# Patient Record
Sex: FEMALE | Race: WHITE | NOT HISPANIC OR LATINO | URBAN - METROPOLITAN AREA
[De-identification: names, ages, dates, MRNs, and addresses within clinical notes are randomized per-mention and may not be internally consistent; named-entity substitution may affect disease eponyms.]

---

## 2017-04-28 PROBLEM — Z00.00 ENCOUNTER FOR PREVENTIVE HEALTH EXAMINATION: Status: ACTIVE | Noted: 2017-04-28

## 2024-03-30 ENCOUNTER — INPATIENT (INPATIENT)
Facility: HOSPITAL | Age: 69
LOS: 4 days | Discharge: HOME | DRG: 853 | End: 2024-04-04
Attending: STUDENT IN AN ORGANIZED HEALTH CARE EDUCATION/TRAINING PROGRAM | Admitting: STUDENT IN AN ORGANIZED HEALTH CARE EDUCATION/TRAINING PROGRAM
Payer: MEDICARE

## 2024-03-30 VITALS
SYSTOLIC BLOOD PRESSURE: 175 MMHG | OXYGEN SATURATION: 97 % | TEMPERATURE: 99 F | WEIGHT: 130.07 LBS | HEART RATE: 132 BPM | RESPIRATION RATE: 20 BRPM | DIASTOLIC BLOOD PRESSURE: 100 MMHG | HEIGHT: 60 IN

## 2024-03-30 PROCEDURE — 99285 EMERGENCY DEPT VISIT HI MDM: CPT

## 2024-03-30 NOTE — ED ADULT TRIAGE NOTE - CHIEF COMPLAINT QUOTE
constipation, previous abd pain and some decreased mental status.  recently tx for UTI.  patient dx with MS and has always had left sided weakness

## 2024-03-30 NOTE — ED ADULT TRIAGE NOTE - PAIN: PRESENCE, MLM
----- Message from JULIO CESAR Garcia sent at 2/14/2019  9:55 AM CST -----  Normal pap and HPV. TRC   denies pain/discomfort (Rating = 0)

## 2024-03-31 ENCOUNTER — TRANSCRIPTION ENCOUNTER (OUTPATIENT)
Age: 69
End: 2024-03-31

## 2024-03-31 DIAGNOSIS — G35 MULTIPLE SCLEROSIS: ICD-10-CM

## 2024-03-31 DIAGNOSIS — R41.82 ALTERED MENTAL STATUS, UNSPECIFIED: ICD-10-CM

## 2024-03-31 DIAGNOSIS — Z29.9 ENCOUNTER FOR PROPHYLACTIC MEASURES, UNSPECIFIED: ICD-10-CM

## 2024-03-31 DIAGNOSIS — A41.9 SEPSIS, UNSPECIFIED ORGANISM: ICD-10-CM

## 2024-03-31 DIAGNOSIS — R41.0 DISORIENTATION, UNSPECIFIED: ICD-10-CM

## 2024-03-31 LAB
ALBUMIN SERPL ELPH-MCNC: 3.7 G/DL — SIGNIFICANT CHANGE UP (ref 3.3–5)
ALBUMIN SERPL ELPH-MCNC: 4 G/DL — SIGNIFICANT CHANGE UP (ref 3.3–5)
ALP SERPL-CCNC: 79 U/L — SIGNIFICANT CHANGE UP (ref 40–120)
ALP SERPL-CCNC: 93 U/L — SIGNIFICANT CHANGE UP (ref 40–120)
ALT FLD-CCNC: 8 U/L — LOW (ref 10–45)
ALT FLD-CCNC: 9 U/L — LOW (ref 10–45)
ANION GAP SERPL CALC-SCNC: 15 MMOL/L — SIGNIFICANT CHANGE UP (ref 5–17)
ANION GAP SERPL CALC-SCNC: 16 MMOL/L — SIGNIFICANT CHANGE UP (ref 5–17)
APPEARANCE UR: ABNORMAL
AST SERPL-CCNC: 12 U/L — SIGNIFICANT CHANGE UP (ref 10–40)
AST SERPL-CCNC: 8 U/L — LOW (ref 10–40)
BACTERIA # UR AUTO: ABNORMAL /HPF
BASE EXCESS BLDV CALC-SCNC: 2.1 MMOL/L — SIGNIFICANT CHANGE UP (ref -2–3)
BASOPHILS # BLD AUTO: 0.08 K/UL — SIGNIFICANT CHANGE UP (ref 0–0.2)
BASOPHILS # BLD AUTO: 0.22 K/UL — HIGH (ref 0–0.2)
BASOPHILS NFR BLD AUTO: 0.3 % — SIGNIFICANT CHANGE UP (ref 0–2)
BASOPHILS NFR BLD AUTO: 0.9 % — SIGNIFICANT CHANGE UP (ref 0–2)
BILIRUB SERPL-MCNC: 0.4 MG/DL — SIGNIFICANT CHANGE UP (ref 0.2–1.2)
BILIRUB SERPL-MCNC: 0.6 MG/DL — SIGNIFICANT CHANGE UP (ref 0.2–1.2)
BILIRUB UR-MCNC: NEGATIVE — SIGNIFICANT CHANGE UP
BUN SERPL-MCNC: 14 MG/DL — SIGNIFICANT CHANGE UP (ref 7–23)
BUN SERPL-MCNC: 14 MG/DL — SIGNIFICANT CHANGE UP (ref 7–23)
CA-I SERPL-SCNC: 1.19 MMOL/L — SIGNIFICANT CHANGE UP (ref 1.15–1.33)
CALCIUM SERPL-MCNC: 8.8 MG/DL — SIGNIFICANT CHANGE UP (ref 8.4–10.5)
CALCIUM SERPL-MCNC: 9.4 MG/DL — SIGNIFICANT CHANGE UP (ref 8.4–10.5)
CAST: 2 /LPF — SIGNIFICANT CHANGE UP (ref 0–4)
CHLORIDE BLDV-SCNC: 105 MMOL/L — SIGNIFICANT CHANGE UP (ref 96–108)
CHLORIDE SERPL-SCNC: 103 MMOL/L — SIGNIFICANT CHANGE UP (ref 96–108)
CHLORIDE SERPL-SCNC: 103 MMOL/L — SIGNIFICANT CHANGE UP (ref 96–108)
CO2 BLDV-SCNC: 29 MMOL/L — HIGH (ref 22–26)
CO2 SERPL-SCNC: 21 MMOL/L — LOW (ref 22–31)
CO2 SERPL-SCNC: 24 MMOL/L — SIGNIFICANT CHANGE UP (ref 22–31)
COLOR SPEC: YELLOW — SIGNIFICANT CHANGE UP
CREAT SERPL-MCNC: 1 MG/DL — SIGNIFICANT CHANGE UP (ref 0.5–1.3)
CREAT SERPL-MCNC: 1.04 MG/DL — SIGNIFICANT CHANGE UP (ref 0.5–1.3)
DIFF PNL FLD: ABNORMAL
EGFR: 58 ML/MIN/1.73M2 — LOW
EGFR: 61 ML/MIN/1.73M2 — SIGNIFICANT CHANGE UP
EOSINOPHIL # BLD AUTO: 0 K/UL — SIGNIFICANT CHANGE UP (ref 0–0.5)
EOSINOPHIL # BLD AUTO: 0 K/UL — SIGNIFICANT CHANGE UP (ref 0–0.5)
EOSINOPHIL NFR BLD AUTO: 0 % — SIGNIFICANT CHANGE UP (ref 0–6)
EOSINOPHIL NFR BLD AUTO: 0 % — SIGNIFICANT CHANGE UP (ref 0–6)
FLUAV AG NPH QL: SIGNIFICANT CHANGE UP
FLUBV AG NPH QL: SIGNIFICANT CHANGE UP
GAS PNL BLDV: 137 MMOL/L — SIGNIFICANT CHANGE UP (ref 136–145)
GAS PNL BLDV: SIGNIFICANT CHANGE UP
GAS PNL BLDV: SIGNIFICANT CHANGE UP
GLUCOSE BLDV-MCNC: 126 MG/DL — HIGH (ref 70–99)
GLUCOSE SERPL-MCNC: 131 MG/DL — HIGH (ref 70–99)
GLUCOSE SERPL-MCNC: 141 MG/DL — HIGH (ref 70–99)
GLUCOSE UR QL: NEGATIVE MG/DL — SIGNIFICANT CHANGE UP
HCO3 BLDV-SCNC: 28 MMOL/L — SIGNIFICANT CHANGE UP (ref 22–29)
HCT VFR BLD CALC: 33.2 % — LOW (ref 34.5–45)
HCT VFR BLD CALC: 36.9 % — SIGNIFICANT CHANGE UP (ref 34.5–45)
HCT VFR BLDA CALC: 35 % — SIGNIFICANT CHANGE UP (ref 34.5–46.5)
HGB BLD CALC-MCNC: 11.6 G/DL — LOW (ref 11.7–16.1)
HGB BLD-MCNC: 10.3 G/DL — LOW (ref 11.5–15.5)
HGB BLD-MCNC: 11.3 G/DL — LOW (ref 11.5–15.5)
IMM GRANULOCYTES NFR BLD AUTO: 0.5 % — SIGNIFICANT CHANGE UP (ref 0–0.9)
KETONES UR-MCNC: 15 MG/DL
LACTATE BLDV-MCNC: 1.7 MMOL/L — SIGNIFICANT CHANGE UP (ref 0.5–2)
LEUKOCYTE ESTERASE UR-ACNC: ABNORMAL
LYMPHOCYTES # BLD AUTO: 0.22 K/UL — LOW (ref 1–3.3)
LYMPHOCYTES # BLD AUTO: 0.85 K/UL — LOW (ref 1–3.3)
LYMPHOCYTES # BLD AUTO: 0.9 % — LOW (ref 13–44)
LYMPHOCYTES # BLD AUTO: 3.4 % — LOW (ref 13–44)
MANUAL SMEAR VERIFICATION: SIGNIFICANT CHANGE UP
MCHC RBC-ENTMCNC: 26 PG — LOW (ref 27–34)
MCHC RBC-ENTMCNC: 26.2 PG — LOW (ref 27–34)
MCHC RBC-ENTMCNC: 30.6 GM/DL — LOW (ref 32–36)
MCHC RBC-ENTMCNC: 31 GM/DL — LOW (ref 32–36)
MCV RBC AUTO: 84.5 FL — SIGNIFICANT CHANGE UP (ref 80–100)
MCV RBC AUTO: 85 FL — SIGNIFICANT CHANGE UP (ref 80–100)
MONOCYTES # BLD AUTO: 1.08 K/UL — HIGH (ref 0–0.9)
MONOCYTES # BLD AUTO: 1.34 K/UL — HIGH (ref 0–0.9)
MONOCYTES NFR BLD AUTO: 4.5 % — SIGNIFICANT CHANGE UP (ref 2–14)
MONOCYTES NFR BLD AUTO: 5.4 % — SIGNIFICANT CHANGE UP (ref 2–14)
NEUTROPHILS # BLD AUTO: 22.4 K/UL — HIGH (ref 1.8–7.4)
NEUTROPHILS # BLD AUTO: 22.58 K/UL — HIGH (ref 1.8–7.4)
NEUTROPHILS NFR BLD AUTO: 90.4 % — HIGH (ref 43–77)
NEUTROPHILS NFR BLD AUTO: 93.7 % — HIGH (ref 43–77)
NITRITE UR-MCNC: NEGATIVE — SIGNIFICANT CHANGE UP
NRBC # BLD: 0 /100 WBCS — SIGNIFICANT CHANGE UP (ref 0–0)
PCO2 BLDV: 47 MMHG — HIGH (ref 39–42)
PH BLDV: 7.38 — SIGNIFICANT CHANGE UP (ref 7.32–7.43)
PH UR: 6 — SIGNIFICANT CHANGE UP (ref 5–8)
PLAT MORPH BLD: NORMAL — SIGNIFICANT CHANGE UP
PLATELET # BLD AUTO: 267 K/UL — SIGNIFICANT CHANGE UP (ref 150–400)
PLATELET # BLD AUTO: 313 K/UL — SIGNIFICANT CHANGE UP (ref 150–400)
PLATELET COUNT - ESTIMATE: NORMAL — SIGNIFICANT CHANGE UP
PO2 BLDV: 53 MMHG — HIGH (ref 25–45)
POTASSIUM BLDV-SCNC: 4 MMOL/L — SIGNIFICANT CHANGE UP (ref 3.5–5.1)
POTASSIUM SERPL-MCNC: 4 MMOL/L — SIGNIFICANT CHANGE UP (ref 3.5–5.3)
POTASSIUM SERPL-MCNC: 4.2 MMOL/L — SIGNIFICANT CHANGE UP (ref 3.5–5.3)
POTASSIUM SERPL-SCNC: 4 MMOL/L — SIGNIFICANT CHANGE UP (ref 3.5–5.3)
POTASSIUM SERPL-SCNC: 4.2 MMOL/L — SIGNIFICANT CHANGE UP (ref 3.5–5.3)
PROT SERPL-MCNC: 7.1 G/DL — SIGNIFICANT CHANGE UP (ref 6–8.3)
PROT SERPL-MCNC: 7.6 G/DL — SIGNIFICANT CHANGE UP (ref 6–8.3)
PROT UR-MCNC: 30 MG/DL
RBC # BLD: 3.93 M/UL — SIGNIFICANT CHANGE UP (ref 3.8–5.2)
RBC # BLD: 4.34 M/UL — SIGNIFICANT CHANGE UP (ref 3.8–5.2)
RBC # FLD: 15.7 % — HIGH (ref 10.3–14.5)
RBC # FLD: 15.7 % — HIGH (ref 10.3–14.5)
RBC BLD AUTO: SIGNIFICANT CHANGE UP
RBC CASTS # UR COMP ASSIST: 24 /HPF — HIGH (ref 0–4)
REVIEW: SIGNIFICANT CHANGE UP
RSV RNA NPH QL NAA+NON-PROBE: SIGNIFICANT CHANGE UP
SAO2 % BLDV: 85.6 % — SIGNIFICANT CHANGE UP (ref 67–88)
SARS-COV-2 RNA SPEC QL NAA+PROBE: SIGNIFICANT CHANGE UP
SODIUM SERPL-SCNC: 140 MMOL/L — SIGNIFICANT CHANGE UP (ref 135–145)
SODIUM SERPL-SCNC: 142 MMOL/L — SIGNIFICANT CHANGE UP (ref 135–145)
SP GR SPEC: 1.02 — SIGNIFICANT CHANGE UP (ref 1–1.03)
SQUAMOUS # UR AUTO: >36 /HPF — HIGH (ref 0–5)
UROBILINOGEN FLD QL: 0.2 MG/DL — SIGNIFICANT CHANGE UP (ref 0.2–1)
WBC # BLD: 23.91 K/UL — HIGH (ref 3.8–10.5)
WBC # BLD: 24.97 K/UL — HIGH (ref 3.8–10.5)
WBC # FLD AUTO: 23.91 K/UL — HIGH (ref 3.8–10.5)
WBC # FLD AUTO: 24.97 K/UL — HIGH (ref 3.8–10.5)
WBC UR QL: 60 /HPF — HIGH (ref 0–5)

## 2024-03-31 PROCEDURE — 70450 CT HEAD/BRAIN W/O DYE: CPT | Mod: 26,MC

## 2024-03-31 PROCEDURE — 71045 X-RAY EXAM CHEST 1 VIEW: CPT | Mod: 26

## 2024-03-31 PROCEDURE — 99223 1ST HOSP IP/OBS HIGH 75: CPT

## 2024-03-31 RX ORDER — BACLOFEN 100 %
30 POWDER (GRAM) MISCELLANEOUS EVERY 8 HOURS
Refills: 0 | Status: DISCONTINUED | OUTPATIENT
Start: 2024-03-31 | End: 2024-03-31

## 2024-03-31 RX ORDER — ATORVASTATIN CALCIUM 80 MG/1
40 TABLET, FILM COATED ORAL AT BEDTIME
Refills: 0 | Status: DISCONTINUED | OUTPATIENT
Start: 2024-03-31 | End: 2024-04-01

## 2024-03-31 RX ORDER — ACETAMINOPHEN 500 MG
1000 TABLET ORAL ONCE
Refills: 0 | Status: COMPLETED | OUTPATIENT
Start: 2024-03-31 | End: 2024-03-31

## 2024-03-31 RX ORDER — BACLOFEN 100 %
30 POWDER (GRAM) MISCELLANEOUS
Refills: 0 | Status: DISCONTINUED | OUTPATIENT
Start: 2024-03-31 | End: 2024-04-01

## 2024-03-31 RX ORDER — LANOLIN ALCOHOL/MO/W.PET/CERES
3 CREAM (GRAM) TOPICAL AT BEDTIME
Refills: 0 | Status: DISCONTINUED | OUTPATIENT
Start: 2024-03-31 | End: 2024-04-01

## 2024-03-31 RX ORDER — CHOLECALCIFEROL (VITAMIN D3) 125 MCG
2000 CAPSULE ORAL DAILY
Refills: 0 | Status: DISCONTINUED | OUTPATIENT
Start: 2024-03-31 | End: 2024-04-01

## 2024-03-31 RX ORDER — SODIUM CHLORIDE 9 MG/ML
1000 INJECTION INTRAMUSCULAR; INTRAVENOUS; SUBCUTANEOUS ONCE
Refills: 0 | Status: COMPLETED | OUTPATIENT
Start: 2024-03-31 | End: 2024-03-31

## 2024-03-31 RX ORDER — CEFTRIAXONE 500 MG/1
1000 INJECTION, POWDER, FOR SOLUTION INTRAMUSCULAR; INTRAVENOUS EVERY 24 HOURS
Refills: 0 | Status: DISCONTINUED | OUTPATIENT
Start: 2024-04-01 | End: 2024-04-01

## 2024-03-31 RX ORDER — TIZANIDINE 4 MG/1
8 TABLET ORAL AT BEDTIME
Refills: 0 | Status: DISCONTINUED | OUTPATIENT
Start: 2024-03-31 | End: 2024-04-01

## 2024-03-31 RX ORDER — ACETAMINOPHEN 500 MG
650 TABLET ORAL EVERY 6 HOURS
Refills: 0 | Status: DISCONTINUED | OUTPATIENT
Start: 2024-03-31 | End: 2024-04-01

## 2024-03-31 RX ORDER — CEFTRIAXONE 500 MG/1
1000 INJECTION, POWDER, FOR SOLUTION INTRAMUSCULAR; INTRAVENOUS ONCE
Refills: 0 | Status: COMPLETED | OUTPATIENT
Start: 2024-03-31 | End: 2024-03-31

## 2024-03-31 RX ORDER — ONDANSETRON 8 MG/1
4 TABLET, FILM COATED ORAL EVERY 8 HOURS
Refills: 0 | Status: DISCONTINUED | OUTPATIENT
Start: 2024-03-31 | End: 2024-04-01

## 2024-03-31 RX ADMIN — SODIUM CHLORIDE 1000 MILLILITER(S): 9 INJECTION INTRAMUSCULAR; INTRAVENOUS; SUBCUTANEOUS at 00:27

## 2024-03-31 RX ADMIN — Medication 30 MILLIGRAM(S): at 06:13

## 2024-03-31 RX ADMIN — Medication 30 MILLIGRAM(S): at 21:03

## 2024-03-31 RX ADMIN — Medication 400 MILLIGRAM(S): at 05:12

## 2024-03-31 RX ADMIN — Medication 1000 MILLIGRAM(S): at 08:27

## 2024-03-31 RX ADMIN — ATORVASTATIN CALCIUM 40 MILLIGRAM(S): 80 TABLET, FILM COATED ORAL at 21:03

## 2024-03-31 RX ADMIN — Medication 30 MILLIGRAM(S): at 15:21

## 2024-03-31 RX ADMIN — CEFTRIAXONE 100 MILLIGRAM(S): 500 INJECTION, POWDER, FOR SOLUTION INTRAMUSCULAR; INTRAVENOUS at 02:04

## 2024-03-31 NOTE — CONSULT NOTE ADULT - ASSESSMENT
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    Sepsis suspected due to UTI  Constipatoin  - AMS with fever and leukocytosis   - UA with some pyuria but contaminated specimen (>36 sq epi cells)  - COVID/RSV/Flu negative   - CXR with no consolidation  - CTH with no acute pathology     Recommendations:  Follow blood and urine cultures, in process  Obtain renal and bladder ultrasound to r/o upper tract disease   Continue ceftriaxone 1g IV Q24h  Monitor temps/WBC  Bowel regimen and rest of care per primary team    Osiel Jiang M.D.  [facility], Division of Infectious Diseases  [phone]  After 5pm on weekdays and all day on weekends - please call [phone]

## 2024-03-31 NOTE — ED PROVIDER NOTE - CLINICAL SUMMARY MEDICAL DECISION MAKING FREE TEXT BOX
68-year-old female, history of MS, presenting for confusion beginning today.  Recently treated for UTI with 5-day course nitrofuran:.  Approximately bedside and patient normally gets confused prior to UTIs.  Patient tachycardic at 132.  On physical exam heart tachycardic with regular rhythm, lungs clear, abdomen soft and nontender.  Will assess for infectious process.  Plan for basic labs, urinalysis plus culture.  Will also CT head Noncon for acute intracranial pathology though low suspicion.

## 2024-03-31 NOTE — H&P ADULT - NSHPPHYSICALEXAM_GEN_ALL_CORE
Vital Signs Last 24 Hrs  T(C): 37.8 (31 Mar 2024 04:48), Max: 38 (31 Mar 2024 01:25)  T(F): 100.1 (31 Mar 2024 04:48), Max: 100.4 (31 Mar 2024 01:25)  HR: 105 (31 Mar 2024 04:37) (105 - 132)  BP: 178/78 (31 Mar 2024 04:37) (156/88 - 178/78)  BP(mean): --  RR: 18 (31 Mar 2024 04:37) (18 - 20)  SpO2: 99% (31 Mar 2024 04:37) (97% - 99%)    Parameters below as of 31 Mar 2024 04:37  Patient On (Oxygen Delivery Method): room air Vital Signs Last 24 Hrs  T(C): 37.8 (31 Mar 2024 04:48), Max: 38 (31 Mar 2024 01:25)  T(F): 100.1 (31 Mar 2024 04:48), Max: 100.4 (31 Mar 2024 01:25)  HR: 105 (31 Mar 2024 04:37) (105 - 132)  BP: 178/78 (31 Mar 2024 04:37) (156/88 - 178/78)  BP(mean): --  RR: 18 (31 Mar 2024 04:37) (18 - 20)  SpO2: 99% (31 Mar 2024 04:37) (97% - 99%)    Parameters below as of 31 Mar 2024 04:37  Patient On (Oxygen Delivery Method): room air    GENERAL: No acute distress,  contracted    HEAD:  Atraumatic, Normocephalic  ENT: EOMI, PERRLA, conjunctiva and sclera clear,  moist mucosa no pharyngeal erythema or exudates   NECK: supple , no JVD   CHEST/LUNG: Clear to auscultation bilaterally; No wheeze, equal breath sounds bilaterally   BACK: No spinal tenderness,  No CVA tenderness   HEART: Regular rate and rhythm; No murmurs, rubs, or gallops  ABDOMEN: Soft, Nontender, + distended tympanic to percussion ; Bowel sounds present  EXTREMITIES:  No clubbing, cyanosis ,trace edema  MSK: contracted w/ limited ROM , No joint swelling or effusions  PSYCH: Normal behavior/affect  NEUROLOGY: AAOx3, paraplegic , cranial nerves intact  SKIN: Dry w/ scale , Normal color, No rashes or open  lesions

## 2024-03-31 NOTE — H&P ADULT - PROBLEM SELECTOR PLAN 3
- c/w baclofen 30mg q8h   - c/w Zaniflex 8mg qhs   - Hold Cellcept in setting of infection/sepsis   - Detrol non formulary, patient can take own medication if available

## 2024-03-31 NOTE — H&P ADULT - TIME BILLING
Chart review , case discussion with ED provider , obtain history via assistance of family ,  examination of patient , answering questions and concerns , ordering labs and medications , and documentation

## 2024-03-31 NOTE — H&P ADULT - PROBLEM SELECTOR PLAN 2
improved w/ treatment of UTI , now back to baseline , CT head w/ no acute findings  - continue above tx 2 seconds or less

## 2024-03-31 NOTE — ED ADULT NURSE NOTE - NSFALLRISKINTERV_ED_ALL_ED

## 2024-03-31 NOTE — H&P ADULT - PROBLEM SELECTOR PLAN 1
T 100.7 , WBC 23 , UA grossly positive , but poor quality specimen , unable to provide localizing symptoms , but mental state improved w/ tx of UTI , CXR w/ no focal lesions , RVP neg   - c/w ceftriaxone   - f/u urine and blood cultures  - s/p 1 L Iv fluid bolus , additional IV fluids as needed

## 2024-03-31 NOTE — ED ADULT NURSE REASSESSMENT NOTE - NS ED NURSE REASSESS COMMENT FT1
Pt changed, urinated in diaper. Pt changed into new clean diaper, pt skin dry and intact. Pt has blanchable sacral skin irritation, however no stageable ulcer. Pt tolerated well, comfort and safety maintained.

## 2024-03-31 NOTE — PATIENT PROFILE ADULT - DEAF OR HARD OF HEARING?
Admission Medication History Completed by Pharmacy    See [facility] Admission Navigator for allergy information, preferred outpatient pharmacy, prior to admission medications and immunization status.     Medication History Sources:   Patient, Surescripts    Changes made to PTA medication list (reason):  Added: None  Deleted:   Fish oil PO: 1 tab PO QDay (per patient)  VIIBRYD 10 m tab PO QDay (per patient and Surescripts)  Changed: None    Additional Information:  Furosemide: Stopped per MD on 21  Amoxicillin: patient took 2000 mg on  prior to a biopsy appointment    Prior to Admission medications    Medication Sig Last Dose Taking? Auth Provider   alendronate (FOSAMAX) 70 MG tablet TAKE 1 (ONE) TABLET (70 mg) BY MOUTH ONCE A WEEK 2021 Yes Reported, Patient   amLODIPine (NORVASC) 10 MG tablet Take 1 tablet (10 mg) by mouth daily NOTE TABLET MG CHANGE 2021 Yes Madhu Zuniga MD   aspirin 81 MG tablet Take 1 tablet (81 mg) by mouth daily. 12/15/2021 Yes Reported, Patient   azelastine (ASTEPRO) 0.15 % nasal spray INSTILL 1 SPRAY INTO BOTH NOSTRILS DAILY 2021 Yes Rolanda Wilcox MD   Calcium Citrate (CITRACAL OR) Take 1 tablet by mouth daily. Strength unknown 2021 Yes Reported, Patient   clopidogrel (PLAVIX) 75 MG tablet Take 1 tablet (75 mg) by mouth daily 2021 Yes Rolanda Wilcox MD   desvenlafaxine (PRISTIQ) 100 MG 24 hr tablet Take 100 mg by mouth daily 2021 Yes Reported, Patient   fluticasone-salmeterol (ADVAIR DISKUS) 500-50 MCG/DOSE inhaler INHALE 1 PUFF INTO THE LUNGS EVERY 12 HOURS 2021 Yes Rolanda Wilcox MD   lamoTRIgine (LAMICTAL) 25 MG tablet Take 25 mg by mouth daily with 200 mg tablet for a total dose of 225 mg 2021 Yes Reported, Patient   LAMOTRIGINE 200 MG PO TABS Take 200 mg by mouth daily with 25 mg tablet for a total dose of 225 mg 2021 Yes Reported, Patient   levothyroxine (SYNTHROID/LEVOTHROID) 50 MCG tablet Take 50 mcg by mouth daily  12/16/2021 Yes Reported, Patient   losartan (COZAAR) 100 MG tablet Take 1 tablet (100 mg) by mouth daily 12/16/2021 Yes Rolanda Wilcox MD   metoprolol tartrate (LOPRESSOR) 50 MG tablet TAKE 1 TAB (50 mg) BY MOUTH IN THE MORNING AND 1/2 TAB (25 mg) IN THE EVENING 12/16/2021 Yes Rolanda Wilcox MD   Multiple Vitamin (MULTI-VITAMIN) per tablet Take 1 tablet by mouth daily 12/16/2021 Yes Reported, Patient   rosuvastatin (CRESTOR) 40 MG tablet TAKE 1 TABLET (40 mg) BY MOUTH EVERY DAY 12/16/2021 Yes Rolanda Wilcox MD   verapamil ER (CALAN-SR) 240 MG CR tablet Take 1 tablet (240 mg) by mouth daily 12/16/2021 Yes Rolanda Wilcox MD   amoxicillin (AMOXIL) 500 MG tablet TAKE 4 TABLETS BY MOUTH 1 HOUR PRIOR TO APPT 12/14/2021  Javier Landa MD   CHOLECALCIFEROL 22751 UNIT PO CAPS 50,000 units by mouth weekly 12/11/2021 Yes Reported, Patient       Date completed: 12/16/21    Medication history completed by: Delia Ball, 4th year PharmD student             no

## 2024-03-31 NOTE — H&P ADULT - NSICDXPASTMEDICALHX_GEN_ALL_CORE_FT
PAST MEDICAL HISTORY:  Asthma     Benign Essential Hypertension     Hyperlipidemia     MS (Multiple Sclerosis) since 1984    Pneumonia 5/11

## 2024-03-31 NOTE — ED ADULT NURSE REASSESSMENT NOTE - NS ED NURSE REASSESS COMMENT FT1
KALPANA Reynolds contacted, pt had one episode of emesis after oral medication administration, no interventions necessary at time, VSS, safety and comfort measures maintained.

## 2024-03-31 NOTE — ED ADULT NURSE REASSESSMENT NOTE - NS ED NURSE REASSESS COMMENT FT1
Received patient from Emma RN, patient at [facility] mental status (unable to state current year), able to make needs known, NAD, VSS, patient agreeable to plan of care, pending Medicine bed, comfort and safety provided.  at bedside, 20G R forearm patent.

## 2024-03-31 NOTE — H&P ADULT - HISTORY OF PRESENT ILLNESS
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 She has similar symptoms in the past with infections. She was recently treated with a five day course of nitrofurantoin for UTI about one week prior to admission. Patient has no sensation and therefore no symptoms of dysuria. No reports fever or chills. No cough or nasal congestion. No open skin wounds. She was recently constipated , but treated with a laxative and suppository with relief of symptoms.

## 2024-03-31 NOTE — ED ADULT NURSE NOTE - OBJECTIVE STATEMENT
68y female w/ pmh of MS presents with abdominal pain. Pt accompanied by family who states pt has been constipated and expereincing abdominal pain for several days. Family states pt had recent UTI which she was treated for. Pt denies fever, chills, chest pain, shortness of breath, nausea, vomiting. Pt minimally ambulatory at baseline due to baseline MS status/ baseline left sided weakness.

## 2024-03-31 NOTE — H&P ADULT - NSICDXFAMILYHX_GEN_ALL_CORE_FT
FAMILY HISTORY:  Father  Still living? Unknown  FH: lung cancer, Age at diagnosis: Age Unknown    Mother  Still living? Unknown  FH: multiple myeloma, Age at diagnosis: Age Unknown

## 2024-03-31 NOTE — PATIENT PROFILE ADULT - FALL HARM RISK - HARM RISK INTERVENTIONS

## 2024-03-31 NOTE — ED PROVIDER NOTE - ATTENDING CONTRIBUTION TO CARE
Patient with history of MS, recently treated UTI last week, here due to somnolence.   According to patient's  patient has been more sleepy and somewhat confused from her baseline.  Yesterday she had complained of mild abdominal pain but it has gone away since she had a small bowel movement after getting a suppository.  There is no reported fever, respiratory symptoms, cough, nausea or vomiting or recurrent urinary symptoms.  Patient has been eating and drinking like normal, although the patient's  does report that she "is often dehydrated".  On exam patient is somnolent but easily arousable, alert and oriented when awake, following commands normally, but falls asleep when not stimulated.  She is breathing comfortably, soft nontender abdomen, afebrile, but tachycardic, dry mucous membranes.  There is concern about recurrent infection versus severe dehydration versus other metabolic arrangement.  While there are no reported falls given degree of cellulitis we will obtain head CT and send sepsis labs and give IV fluids.  We will antibiose as needed if labs show signs of infection.

## 2024-03-31 NOTE — ED PROVIDER NOTE - OBJECTIVE STATEMENT
68-year-old female, history of MS, presenting to ED brought in by  for confusion beginning earlier this afternoon.  Per  at bedside, patient was diagnosed with UTI about a week ago, completed 5-day course of nitrofurantoin.  States patient normally presents with some confusion prior to getting UTIs.  No recent head trauma or falls.  No cough, congestion, fever. 68-year-old female, history of MS, presenting to ED brought in by  for confusion beginning earlier this afternoon.  Per  at bedside, patient was diagnosed with UTI about a week ago, completed 5-day course of nitrofurantoin.  States patient normally presents with some confusion prior to getting UTIs.  No recent head trauma or falls.  No cough, congestion, fever.   notes patient c/o lower abdominal pain a few days ago with constipation, had small BM, and after suppository expelled large amount of gas with relief in pain.

## 2024-03-31 NOTE — H&P ADULT - NSHPREVIEWOFSYSTEMS_GEN_ALL_CORE
CONSTITUTIONAL: + weakness, no  fevers or chills  EYES/ENT: No visual changes;  No dysphagia  NECK: No pain or stiffness  RESPIRATORY: No cough, wheezing, hemoptysis; No shortness of breath  CARDIOVASCULAR: No chest pain or palpitations; No lower extremity edema  EXTREMITIES: no le edema, cyanosis, clubbing  GASTROINTESTINAL: No abdominal or epigastric pain. No nausea, vomiting, or hematemesis; No diarrhea + constipation. No melena or hematochezia.  BACK: No back pain  GENITOURINARY: No dysuria, frequency or hematuria  NEUROLOGICAL: + AMS , No change in numbness or weakness  MSK: no joint swelling or pain  SKIN: No itching, burning, rashes, or lesions   PSYCH: no agitation  All other review of systems is negative unless indicated above.

## 2024-03-31 NOTE — ED PROVIDER NOTE - PHYSICAL EXAMINATION
CONSTITUTIONAL: Somnolent appearing, arousable, and in no apparent distress.  HEAD: normocephalic, atraumatic  ENT: dry mucosa moist  CARDIAC: regular rate and rhythm; no murmurs, rubs, or gallops  RESPIRATORY: normal breath sounds, clear to ascultation bilaterally, no rales, rhonchi, wheezing  GASTROINTESTINAL: abdomen nondistended, soft, nontender, no guarding, rebound tenderness  MSK: Spine appears normal, range of motion is not limited, no muscle or joint tenderness  NEURO: Somnolent, but arousable and answers questions appropriately; at baseline neurologic condition   SKIN: Skin normal color for race, warm, dry and intact. No evidence of rash.  PSYCH: appropriate mood and affect

## 2024-03-31 NOTE — H&P ADULT - NSHPLABSRESULTS_GEN_ALL_CORE
Labs personally reviewed:                          11.3   23.91 )-----------( 313      ( 31 Mar 2024 00:02 )             36.9         142  |  103  |  14  ----------------------------<  131<H>  4.2   |  24  |  1.04    Ca    9.4      31 Mar 2024 00:02    TPro  7.6  /  Alb  4.0  /  TBili  0.6  /  DBili  x   /  AST  12  /  ALT  9<L>  /  AlkPhos  93          LIVER FUNCTIONS - ( 31 Mar 2024 00:02 )  Alb: 4.0 g/dL / Pro: 7.6 g/dL / ALK PHOS: 93 U/L / ALT: 9 U/L / AST: 12 U/L / GGT: x             Urinalysis Basic - ( 31 Mar 2024 01:23 )    Color: Yellow / Appearance: Turbid / S.020 / pH: x  Gluc: x / Ketone: 15 mg/dL  / Bili: Negative / Urobili: 0.2 mg/dL   Blood: x / Protein: 30 mg/dL / Nitrite: Negative   Leuk Esterase: Moderate / RBC: 24 /HPF / WBC 60 /HPF   Sq Epi: x / Non Sq Epi: >36 /HPF / Bacteria: Many /HPF      CAPILLARY BLOOD GLUCOSE      POCT Blood Glucose.: 137 mg/dL (30 Mar 2024 22:05)      Imaging:  CXR personally reviewed: no focal opacity    CT head :  No acute intracranial pathology.    Bifrontal atrophy again appreciated.      EKG personally reviewed: Labs personally reviewed:                          11.3   23.91 )-----------( 313      ( 31 Mar 2024 00:02 )             36.9         142  |  103  |  14  ----------------------------<  131<H>  4.2   |  24  |  1.04    Ca    9.4      31 Mar 2024 00:02    TPro  7.6  /  Alb  4.0  /  TBili  0.6  /  DBili  x   /  AST  12  /  ALT  9<L>  /  AlkPhos  93          LIVER FUNCTIONS - ( 31 Mar 2024 00:02 )  Alb: 4.0 g/dL / Pro: 7.6 g/dL / ALK PHOS: 93 U/L / ALT: 9 U/L / AST: 12 U/L / GGT: x             Urinalysis Basic - ( 31 Mar 2024 01:23 )    Color: Yellow / Appearance: Turbid / S.020 / pH: x  Gluc: x / Ketone: 15 mg/dL  / Bili: Negative / Urobili: 0.2 mg/dL   Blood: x / Protein: 30 mg/dL / Nitrite: Negative   Leuk Esterase: Moderate / RBC: 24 /HPF / WBC 60 /HPF   Sq Epi: x / Non Sq Epi: >36 /HPF / Bacteria: Many /HPF      CAPILLARY BLOOD GLUCOSE      POCT Blood Glucose.: 137 mg/dL (30 Mar 2024 22:05)      Imaging:  CXR personally reviewed: no focal opacity    CT head :  No acute intracranial pathology.    Bifrontal atrophy again appreciated.      EKG

## 2024-04-01 LAB
ALBUMIN SERPL ELPH-MCNC: 3.1 G/DL — LOW (ref 3.3–5)
ALP SERPL-CCNC: 66 U/L — SIGNIFICANT CHANGE UP (ref 40–120)
ALT FLD-CCNC: 7 U/L — LOW (ref 10–45)
ANION GAP SERPL CALC-SCNC: 15 MMOL/L — SIGNIFICANT CHANGE UP (ref 5–17)
AST SERPL-CCNC: 10 U/L — SIGNIFICANT CHANGE UP (ref 10–40)
BILIRUB SERPL-MCNC: 0.3 MG/DL — SIGNIFICANT CHANGE UP (ref 0.2–1.2)
BUN SERPL-MCNC: 15 MG/DL — SIGNIFICANT CHANGE UP (ref 7–23)
CALCIUM SERPL-MCNC: 8.7 MG/DL — SIGNIFICANT CHANGE UP (ref 8.4–10.5)
CHLORIDE SERPL-SCNC: 101 MMOL/L — SIGNIFICANT CHANGE UP (ref 96–108)
CO2 SERPL-SCNC: 22 MMOL/L — SIGNIFICANT CHANGE UP (ref 22–31)
CREAT SERPL-MCNC: 0.91 MG/DL — SIGNIFICANT CHANGE UP (ref 0.5–1.3)
CULTURE RESULTS: SIGNIFICANT CHANGE UP
EGFR: 68 ML/MIN/1.73M2 — SIGNIFICANT CHANGE UP
GLUCOSE SERPL-MCNC: 97 MG/DL — SIGNIFICANT CHANGE UP (ref 70–99)
HCT VFR BLD CALC: 30.1 % — LOW (ref 34.5–45)
HCV AB S/CO SERPL IA: 0.93 S/CO — SIGNIFICANT CHANGE UP (ref 0–0.99)
HCV AB SERPL-IMP: ABNORMAL
HCV RNA FLD QL NAA+PROBE: SIGNIFICANT CHANGE UP
HCV RNA SPEC QL PROBE+SIG AMP: SIGNIFICANT CHANGE UP
HGB BLD-MCNC: 9.5 G/DL — LOW (ref 11.5–15.5)
MCHC RBC-ENTMCNC: 26.6 PG — LOW (ref 27–34)
MCHC RBC-ENTMCNC: 31.6 GM/DL — LOW (ref 32–36)
MCV RBC AUTO: 84.3 FL — SIGNIFICANT CHANGE UP (ref 80–100)
NRBC # BLD: 0 /100 WBCS — SIGNIFICANT CHANGE UP (ref 0–0)
PLATELET # BLD AUTO: 234 K/UL — SIGNIFICANT CHANGE UP (ref 150–400)
POTASSIUM SERPL-MCNC: 4 MMOL/L — SIGNIFICANT CHANGE UP (ref 3.5–5.3)
POTASSIUM SERPL-SCNC: 4 MMOL/L — SIGNIFICANT CHANGE UP (ref 3.5–5.3)
PROT SERPL-MCNC: 6.3 G/DL — SIGNIFICANT CHANGE UP (ref 6–8.3)
RBC # BLD: 3.57 M/UL — LOW (ref 3.8–5.2)
RBC # FLD: 15.8 % — HIGH (ref 10.3–14.5)
SODIUM SERPL-SCNC: 138 MMOL/L — SIGNIFICANT CHANGE UP (ref 135–145)
SPECIMEN SOURCE: SIGNIFICANT CHANGE UP
WBC # BLD: 12.07 K/UL — HIGH (ref 3.8–10.5)
WBC # FLD AUTO: 12.07 K/UL — HIGH (ref 3.8–10.5)

## 2024-04-01 PROCEDURE — 74176 CT ABD & PELVIS W/O CONTRAST: CPT | Mod: 26

## 2024-04-01 PROCEDURE — 52332 CYSTOSCOPY AND TREATMENT: CPT | Mod: 50

## 2024-04-01 PROCEDURE — 74420 UROGRAPHY RTRGR +-KUB: CPT | Mod: 26

## 2024-04-01 PROCEDURE — 76770 US EXAM ABDO BACK WALL COMP: CPT | Mod: 26

## 2024-04-01 DEVICE — URETERAL STENT INLAY OPTIMA 8FR 24CM: Type: IMPLANTABLE DEVICE | Site: LEFT, RIGHT | Status: FUNCTIONAL

## 2024-04-01 DEVICE — GUIDEWIRE SENSOR DUAL-FLEX NITINOL STRAIGHT .035" X 150CM: Type: IMPLANTABLE DEVICE | Site: LEFT, RIGHT | Status: FUNCTIONAL

## 2024-04-01 DEVICE — URETERAL CATH SOF-FLEX OPEN END 6FR .040" X 70CM: Type: IMPLANTABLE DEVICE | Site: LEFT, RIGHT | Status: FUNCTIONAL

## 2024-04-01 RX ORDER — CHOLECALCIFEROL (VITAMIN D3) 125 MCG
2000 CAPSULE ORAL DAILY
Refills: 0 | Status: DISCONTINUED | OUTPATIENT
Start: 2024-04-01 | End: 2024-04-04

## 2024-04-01 RX ORDER — LANOLIN ALCOHOL/MO/W.PET/CERES
3 CREAM (GRAM) TOPICAL AT BEDTIME
Refills: 0 | Status: DISCONTINUED | OUTPATIENT
Start: 2024-04-01 | End: 2024-04-04

## 2024-04-01 RX ORDER — FENTANYL CITRATE 50 UG/ML
25 INJECTION INTRAVENOUS
Refills: 0 | Status: DISCONTINUED | OUTPATIENT
Start: 2024-04-01 | End: 2024-04-02

## 2024-04-01 RX ORDER — CEFTRIAXONE 500 MG/1
1000 INJECTION, POWDER, FOR SOLUTION INTRAMUSCULAR; INTRAVENOUS EVERY 24 HOURS
Refills: 0 | Status: DISCONTINUED | OUTPATIENT
Start: 2024-04-01 | End: 2024-04-04

## 2024-04-01 RX ORDER — SODIUM CHLORIDE 9 MG/ML
1000 INJECTION INTRAMUSCULAR; INTRAVENOUS; SUBCUTANEOUS
Refills: 0 | Status: DISCONTINUED | OUTPATIENT
Start: 2024-04-01 | End: 2024-04-04

## 2024-04-01 RX ORDER — ONDANSETRON 8 MG/1
4 TABLET, FILM COATED ORAL EVERY 8 HOURS
Refills: 0 | Status: DISCONTINUED | OUTPATIENT
Start: 2024-04-01 | End: 2024-04-04

## 2024-04-01 RX ORDER — ACETAMINOPHEN 500 MG
650 TABLET ORAL EVERY 6 HOURS
Refills: 0 | Status: DISCONTINUED | OUTPATIENT
Start: 2024-04-01 | End: 2024-04-04

## 2024-04-01 RX ORDER — ATORVASTATIN CALCIUM 80 MG/1
40 TABLET, FILM COATED ORAL AT BEDTIME
Refills: 0 | Status: DISCONTINUED | OUTPATIENT
Start: 2024-04-01 | End: 2024-04-04

## 2024-04-01 RX ORDER — SODIUM CHLORIDE 9 MG/ML
1000 INJECTION INTRAMUSCULAR; INTRAVENOUS; SUBCUTANEOUS
Refills: 0 | Status: DISCONTINUED | OUTPATIENT
Start: 2024-04-01 | End: 2024-04-01

## 2024-04-01 RX ORDER — BACLOFEN 100 %
5 POWDER (GRAM) MISCELLANEOUS EVERY 12 HOURS
Refills: 0 | Status: DISCONTINUED | OUTPATIENT
Start: 2024-04-01 | End: 2024-04-02

## 2024-04-01 RX ORDER — ONDANSETRON 8 MG/1
4 TABLET, FILM COATED ORAL ONCE
Refills: 0 | Status: DISCONTINUED | OUTPATIENT
Start: 2024-04-01 | End: 2024-04-02

## 2024-04-01 RX ORDER — ACETAMINOPHEN 500 MG
1000 TABLET ORAL ONCE
Refills: 0 | Status: COMPLETED | OUTPATIENT
Start: 2024-04-01 | End: 2024-04-01

## 2024-04-01 RX ADMIN — Medication 30 MILLIGRAM(S): at 17:47

## 2024-04-01 RX ADMIN — Medication 2000 UNIT(S): at 10:50

## 2024-04-01 RX ADMIN — SODIUM CHLORIDE 75 MILLILITER(S): 9 INJECTION INTRAMUSCULAR; INTRAVENOUS; SUBCUTANEOUS at 10:50

## 2024-04-01 RX ADMIN — Medication 30 MILLIGRAM(S): at 10:51

## 2024-04-01 RX ADMIN — Medication 400 MILLIGRAM(S): at 00:39

## 2024-04-01 RX ADMIN — Medication 30 MILLIGRAM(S): at 05:27

## 2024-04-01 RX ADMIN — TIZANIDINE 8 MILLIGRAM(S): 4 TABLET ORAL at 00:02

## 2024-04-01 RX ADMIN — Medication 1000 MILLIGRAM(S): at 01:39

## 2024-04-01 RX ADMIN — SODIUM CHLORIDE 75 MILLILITER(S): 9 INJECTION INTRAMUSCULAR; INTRAVENOUS; SUBCUTANEOUS at 00:43

## 2024-04-01 RX ADMIN — CEFTRIAXONE 100 MILLIGRAM(S): 500 INJECTION, POWDER, FOR SOLUTION INTRAMUSCULAR; INTRAVENOUS at 02:00

## 2024-04-01 NOTE — PROGRESS NOTE ADULT - SUBJECTIVE AND OBJECTIVE BOX
Patient is a 68y old  Female who presents with a chief complaint of UTI/ sepsis (01 Apr 2024 10:26)      SUBJECTIVE / OVERNIGHT EVENTS:    Patient seen and examined.   off floor for US.    Vital Signs Last 24 Hrs  T(C): 37.1 (01 Apr 2024 04:35), Max: 37.3 (01 Apr 2024 00:32)  T(F): 98.7 (01 Apr 2024 04:35), Max: 99.1 (01 Apr 2024 00:32)  HR: 121 (01 Apr 2024 04:35) (90 - 124)  BP: 148/87 (01 Apr 2024 04:35) (148/85 - 173/89)  BP(mean): --  RR: 18 (01 Apr 2024 04:35) (17 - 18)  SpO2: 99% (01 Apr 2024 04:35) (96% - 100%)    Parameters below as of 01 Apr 2024 04:35  Patient On (Oxygen Delivery Method): room air      I&O's Summary    31 Mar 2024 07:01  -  01 Apr 2024 07:00  --------------------------------------------------------  IN: 0 mL / OUT: 600 mL / NET: -600 mL    LABS:                        9.5    12.07 )-----------( 234      ( 01 Apr 2024 07:12 )             30.1     04-01    138  |  101  |  15  ----------------------------<  97  4.0   |  22  |  0.91    Ca    8.7      01 Apr 2024 07:10    TPro  6.3  /  Alb  3.1<L>  /  TBili  0.3  /  DBili  x   /  AST  10  /  ALT  7<L>  /  AlkPhos  66  04-01      CAPILLARY BLOOD GLUCOSE            Urinalysis Basic - ( 01 Apr 2024 07:10 )    Color: x / Appearance: x / SG: x / pH: x  Gluc: 97 mg/dL / Ketone: x  / Bili: x / Urobili: x   Blood: x / Protein: x / Nitrite: x   Leuk Esterase: x / RBC: x / WBC x   Sq Epi: x / Non Sq Epi: x / Bacteria: x        RADIOLOGY & ADDITIONAL TESTS:    Imaging Personally Reviewed:  [x] YES  [ ] NO    Consultant(s) Notes Reviewed:  [x] YES  [ ] NO    MEDICATIONS  (STANDING):  atorvastatin 40 milliGRAM(s) Oral at bedtime  baclofen 30 milliGRAM(s) Oral <User Schedule>  cefTRIAXone   IVPB 1000 milliGRAM(s) IV Intermittent every 24 hours  cholecalciferol 2000 Unit(s) Oral daily  sodium chloride 0.9%. 1000 milliLiter(s) (75 mL/Hr) IV Continuous <Continuous>  sodium chloride 0.9%. 1000 milliLiter(s) (75 mL/Hr) IV Continuous <Continuous>  tiZANidine 8 milliGRAM(s) Oral at bedtime    MEDICATIONS  (PRN):  acetaminophen     Tablet .. 650 milliGRAM(s) Oral every 6 hours PRN Temp greater or equal to 38C (100.4F), Mild Pain (1 - 3)  melatonin 3 milliGRAM(s) Oral at bedtime PRN Insomnia  ondansetron Injectable 4 milliGRAM(s) IV Push every 8 hours PRN Nausea and/or Vomiting      Care Discussed with Consultants/Other Providers [x] YES  [ ] NO    HEALTH ISSUES - PROBLEM Dx:  Sepsis secondary to UTI    Multiple sclerosis    Need for prophylactic measure    AMS (altered mental status)

## 2024-04-01 NOTE — PROGRESS NOTE ADULT - ASSESSMENT
68F  w/pmh of MS paraplegic on Cellcept p/w AMS , febrile w/ leukocytosis     Sepsis secondary to UTI  - c/w ceftriaxone   - f/u urine and blood cultures  - renal US    AMS (altered mental status).   improved w/ treatment of UTI , now back to baseline , CT head w/ no acute findings  - continue above tx    Multiple sclerosis  - c/w baclofen 30mg q8h   - c/w Zaniflex 8mg qhs   - Hold Cellcept in setting of infection/sepsis   - Detrol non formulary, patient can take own medication if available    lmwh for dvt ppx    Please call [facility] with questions [phone].

## 2024-04-01 NOTE — CONSULT NOTE ADULT - ATTENDING COMMENTS
CT, ultrasound, labs reviewed  Fever, bilateral ureteral hydronephrosis, left distal stone and right staghorn stone.  Concern for  source  Emergent OR for cystoscopy bilateral ureteral stents insertion.  R/b/a explained to patient and her spouse.  Consent obtained.

## 2024-04-01 NOTE — ADVANCED PRACTICE NURSE CONSULT - REASON FOR CONSULT
Requested by staff to assess skin status: sacrum and b/l heels. PMH is noted:  Reason for Admission: UTI/ sepsis  History of Present Illness:   Patient is a 68-year-old female, w/pmh of MS paraplegic on cellcept, presents for altered mental state for the past day   Per  , patient was confused with slurred speech, repeating herself , and disoriented. She has similar symptoms in the past with infections. She was recently treated with a five day course of nitrofurantoin for UTI about one week prior to admission. Patient has no sensation and therefore no symptoms of dysuria. No reports fever or chills. No cough or nasal congestion. No open skin wounds. She was recently constipated , but treated with a laxative and suppository with relief of symptoms.

## 2024-04-01 NOTE — BRIEF OPERATIVE NOTE - SPECIMENS
"Patient presents to the ED via personal transportation alone. Patient reports that last night, after having sex, she felt really weak and her lips changed color to "a dark blue or purple" color. Patient denies using any new soaps, lotions, eating new foods. Patient also states that she was recently diagnosed with a vaginal infection and had vaginal bleeding that has stopped, but patient did not finish taking her antibiocs and go to her follow up at her appt.  " bl kidney urine

## 2024-04-01 NOTE — ADVANCED PRACTICE NURSE CONSULT - RECOMMEDATIONS
Will recommend the followin. Sacrum, b/l buttocks: continue to monitor, routine pericare with Layo  2. continue to encourage mobility, T&P  3. off-load heels with boots/cushion  4. Seat cushion when OOB to chair  5. nutrition support as pt conditio allows  Tx plan discussed with RN

## 2024-04-01 NOTE — CONSULT NOTE ADULT - ASSESSMENT
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 US obtained today which shows R hydro and c/f staghorn calculus.  consulted. Last fever >24 hours ago, currently afebrile. . BP and SpO2 stable. WBC 12 from 25, Cr 0.9. Urine Cx <10K normal gu michelle. CT 2011 shows R sided hydro with evidence of obstruction    Recs:  - keep NPO  - obtain urgent CT [facility] renal stone hunt for further evaluation of  tract  - potential need for ureteral stent vs PCN pending imaging results  - continue broad spectrum antibiotics  - trend fevers, WBC 68F with PMHx of MS paraplegic on Cellcept admitted with AMS, fevers w/ leukocytosis likely from urinary source. US obtained today which shows R hydro and c/f staghorn calculus.  consulted. Last fever >24 hours ago, currently afebrile. . BP and SpO2 stable. WBC 12 from 25, Cr 0.9. Urine Cx <10K normal gu michelle. CT 2011 shows R sided hydro without evidence of obstruction    Recs:  - keep NPO  - obtain urgent CT AP renal stone hunt for further evaluation of  tract  - potential need for ureteral stent vs PCN pending imaging results  - continue broad spectrum antibiotics  - trend fevers, WBC 68F with PMHx of MS paraplegic on Cellcept admitted with AMS, fevers w/ leukocytosis likely from urinary source. US obtained today which shows R hydro and c/f staghorn calculus.  consulted. Last fever >24 hours ago, currently afebrile. . BP and SpO2 stable. WBC 12 from 25, Cr 0.9. Urine Cx <10K normal gu michelle. CT 2011 shows R sided hydro without evidence of obstruction    Recs:  - keep NPO  - obtain urgent CT [facility] renal stone hunt for further evaluation of  tract  - potential need for intervention for urinary tract decompression pending CT results  - continue broad spectrum antibiotics per primary team  - trend fevers, WBC, downtrending, AF >24 hours  - Please call urology immediately after CT scan is performed to review imaging and determine final plan.  - Discussed with Dr. Rowley.     [facility]  [address] [place] [id number] [phone]

## 2024-04-01 NOTE — PROGRESS NOTE ADULT - ASSESSMENT
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    Sepsis suspected due to UTI  Constipatoin  - AMS with fever and leukocytosis    -- mental status improved, afebrile now and WBC downtrending   - UA with some pyuria but contaminated specimen (>36 sq epi cells)  - Ucx negative   - COVID/RSV/Flu negative   - CXR with no consolidation  - CTH with no acute pathology     Recommendations:  Follow blood cultures - NGTD x2   Follow up renal and bladder ultrasound to r/o upper tract disease   Continue ceftriaxone 1g IV Q24h for now pending above   Monitor temps/WBC  Bowel regimen and rest of care per primary team    Osiel Jiang M.D.  [facility], Division of Infectious Diseases  [phone]  After 5pm on weekdays and all day on weekends - please call [phone]

## 2024-04-01 NOTE — CONSULT NOTE ADULT - SUBJECTIVE AND OBJECTIVE BOX
[facility] DIVISION OF INFECTIOUS DISEASES  WESLEY Corrales S. Shah, Y. Patel, G. [facility]  [phone]  ([phone] - weekdays after 5pm and weekends)    RITU HANNON  68y, Female  [id number]    HPI:  Patient is a 68-year-old female, w/pmh of MS paraplegic on cellcept, presents for altered mental state for the past day. Per  , patient was confused with slurred speech, repeating herself , and disoriented. She has similar symptoms in the past with infections. She was recently treated with a five day course of nitrofurantoin for UTI about one week prior to admission. Patient has no sensation and therefore no symptoms of dysuria. No reports fever or chills. No cough or nasal congestion. No open skin wounds. She was recently constipated , but treated with a laxative and suppository with relief of symptoms.  (31 Mar 2024 05:29)  Patient seen and examined at bedside this morning in the ER,  at bedside. Per , patient had improved after antibiotics, slept overnight and now dozing off frequently. Patient currently denies any pain, fevers or chills. Per , patient was fine when he left her to  their daughter at 6pm but when he returned at 8pm, patient was altered similar to how she has been in the past with infections. She took 5 days of nitrofurantoin from 3/14-3/19; at that time she was repeating herself and a home urine test was positive.  reports patients last BM was "rock hard" and she took stool softener but only passed gas, no BM today.   ROS: 14 point review of systems completed, pertinent positives and negatives as per HPI.    Allergies: shellfish. (Unknown)  sulfa drugs (Unknown)  shellfish (Unknown)    PMH -- MS (Multiple Sclerosis)  Asthma  Benign Essential Hypertension  Hyperlipidemia  Pneumonia    PSH -- No significant past surgical history    FH -- FH: multiple myeloma (Mother)  FH: lung cancer (Father)    Social History -- denies tobacco, alcohol or illicit drug use    Physical Exam--  Vital Signs Last 24 Hrs  T(F): 98.2 (31 Mar 2024 07:34), Max: 100.4 (31 Mar 2024 01:25)  HR: 95 (31 Mar 2024 07:34) (95 - 132)  BP: 153/82 (31 Mar 2024 07:34) (153/82 - 184/83)  RR: 16 (31 Mar 2024 07:34) (16 - 20)  SpO2: 97% (31 Mar 2024 07:34) (96% - 99%)  General: no acute distress  HEENT: NC/AT, EOMI, anicteric, neck supple  Lungs: Clear bilaterally without rales, wheezing or rhonchi  Heart: S1, S2 present, normal rate, no murmur heard   Abdomen: Soft. Nondistended. Nontender. BS present.   Neuro: AAOx2, not to time, frequently dozes off   Extremities: No cyanosis or clubbing. No edema.   Skin: Warm. Very dry skin on LE. No visible rash.  Lines: PIV    Laboratory & Imaging Data--  CBC:                       10.3   24.97 )-----------( 267      ( 31 Mar 2024 07:32 )             33.2     WBC Count: 24.97 K/uL (03-31-24 @ 07:32)  WBC Count: 23.91 K/uL (03-31-24 @ 00:02)    CMP: 03-31    140  |  103  |  14  ----------------------------<  141<H>  4.0   |  21<L>  |  1.00    Ca    8.8      31 Mar 2024 07:32    TPro  7.1  /  Alb  3.7  /  TBili  0.4  /  DBili  x   /  AST  8<L>  /  ALT  8<L>  /  AlkPhos  79  03-31    LIVER FUNCTIONS - ( 31 Mar 2024 07:32 )  Alb: 3.7 g/dL / Pro: 7.1 g/dL / ALK PHOS: 79 U/L / ALT: 8 U/L / AST: 8 U/L / GGT: x           Urinalysis + Microscopic Examination (03.31.24 @ 01:23)    pH Urine: 6.0   Urine Appearance: Turbid   Color: Yellow   Specific Gravity: 1.020   Protein, Urine: 30 mg/dL   Glucose Qualitative, Urine: Negative mg/dL   Ketone - Urine: 15 mg/dL   Blood, Urine: Moderate   Bilirubin: Negative   Urobilinogen: 0.2 mg/dL   Leukocyte Esterase Concentration: Moderate   Nitrite: Negative   Review: Reviewed   White Blood Cell - Urine: 60 /HPF   Red Blood Cell - Urine: 24 /HPF   Bacteria: Many /HPF   Cast: 2 /LPF   Epithelial Cells: >36 /HPF      Microbiology: reviewed  Flu With COVID-19 By ADARSH (03.31.24 @ 00:59)    SARS-CoV-2 Result: NotDete: This Respiratory Panel uses polymerase chain reaction (PCR) to detect for  influenza A; influenza B; respiratory syncytial virus; and SARS-CoV-2.  This test was validated by [facility] and is in use under the FDA  Emergency Use Authorization (EUA) for clinical labs CLIA-certified to  perform high complexity testing. Test results should be correlated with  clinical presentation, patient history, and epidemiology.   Influenza A Result: [facility]   Influenza B Result: [facility]   Resp Syn Virus Result: [facility]    Radiology--reviewed  < from: Xray Chest 1 View AP/PA (03.31.24 @ 01:00) >  FINDINGS:    The heart sizeis normal.  The lungs are clear.  There is no pneumothorax or pleural effusion.    IMPRESSION:  Clear lungs.    < end of copied text >  < from: CT Head No Cont (03.31.24 @ 00:36) >  IMPRESSION: No acute intracranial pathology.    Bifrontal atrophy again appreciated.    < end of copied text >    Active Medications--  acetaminophen     Tablet .. 650 milliGRAM(s) Oral every 6 hours PRN  atorvastatin 40 milliGRAM(s) Oral at bedtime  baclofen 30 milliGRAM(s) Oral every 8 hours  cholecalciferol 2000 Unit(s) Oral daily  melatonin 3 milliGRAM(s) Oral at bedtime PRN  ondansetron Injectable 4 milliGRAM(s) IV Push every 8 hours PRN  tiZANidine 8 milliGRAM(s) Oral at bedtime    Current Antimicrobials:   Prior/Completed Antimicrobials:  cefTRIAXone   IVPB    
HPI: 68F with PMHx of MS paraplegic on Cellcept admitted with AMS, fevers w/ leukocytosis likely from urinary source. US obtained today which shows R hydro and c/f staghorn calculus.  consulted. Pt seen and examined. Reports that ~15 days ago, had AMS and was prescribed 7d course of macrobid for suspected UTI by PCP. AMS improved. Starting two days, began having AMS again.  brought to ED. In ED, febrile and tachycardiac with WBC 25. Also noted to have UA suspicious for UTI. Pt denies chills, nausea/vomiting, flank pain, abd pain, dysuria, hematuria, increased frequency or other acute symptoms at this time. No urologist. No prior history of stones.  Of note, had last UTI 12 days ago, also with AMS as presenting symptom and was in hospital for >1 month, at that time. Here, started on CTX in ED, and feeling improved. Last fever >24 hours.       PAST MEDICAL & SURGICAL HISTORY:  MS (Multiple Sclerosis) since 1984  Asthma  Benign Essential Hypertension  Hyperlipidemia  Pneumonia 5/11  No significant past surgical history    MEDICATIONS  (STANDING):  atorvastatin 40 milliGRAM(s) Oral at bedtime  baclofen 30 milliGRAM(s) Oral <User Schedule>  cefTRIAXone   IVPB 1000 milliGRAM(s) IV Intermittent every 24 hours  cholecalciferol 2000 Unit(s) Oral daily  sodium chloride 0.9%. 1000 milliLiter(s) (75 mL/Hr) IV Continuous <Continuous>  sodium chloride 0.9%. 1000 milliLiter(s) (75 mL/Hr) IV Continuous <Continuous>  tiZANidine 8 milliGRAM(s) Oral at bedtime    MEDICATIONS  (PRN):  acetaminophen     Tablet .. 650 milliGRAM(s) Oral every 6 hours PRN Temp greater or equal to 38C (100.4F), Mild Pain (1 - 3)  melatonin 3 milliGRAM(s) Oral at bedtime PRN Insomnia  ondansetron Injectable 4 milliGRAM(s) IV Push every 8 hours PRN Nausea and/or Vomiting      FAMILY HISTORY:  FH: multiple myeloma (Mother)    FH: lung cancer (Father)        Allergies    shellfish. (Unknown)  sulfa drugs (Unknown)  shellfish (Unknown)    Intolerances        SOCIAL HISTORY:    REVIEW OF SYSTEMS: Otherwise negative as stated in HPI    Physical Exam  Vital signs  T(C): 36.9 (04-01-24 @ 12:15), Max: 37.3 (04-01-24 @ 00:32)  HR: 111 (04-01-24 @ 12:15)  BP: 145/73 (04-01-24 @ 12:15)  SpO2: 96% (04-01-24 @ 12:15)  Wt(kg): --    Output    OUT:    Voided (mL): 600 mL  Total OUT: 600 mL    Total NET: -600 mL      OUT:    Voided (mL): 300 mL  Total OUT: 300 mL    Total NET: -300 mL          Gen: NAD  Pulm: No respiratory distress  Abd: soft, mild TTP of RLQ, nondistended  : voided urine cloudy yellow. no CVAT BL      LABS:             9.5    12.07 )-----------( 234      ( 01 Apr 2024 07:12 )             30.1       04-01    138  |  101  |  15  ----------------------------<  97  4.0   |  22  |  0.91    Ca    8.7      01 Apr 2024 07:10    TPro  6.3  /  Alb  3.1<L>  /  TBili  0.3  /  DBili  x   /  AST  10  /  ALT  7<L>  /  AlkPhos  66  04-01    Urinalysis + Microscopic Examination (03.31.24 @ 01:23)    pH Urine: 6.0   Urine Appearance: Turbid   Color: Yellow   Specific Gravity: 1.020   Protein, Urine: 30 mg/dL   Glucose Qualitative, Urine: Negative mg/dL   Ketone - Urine: 15 mg/dL   Blood, Urine: Moderate   Bilirubin: Negative   Urobilinogen: 0.2 mg/dL   Leukocyte Esterase Concentration: Moderate   Nitrite: Negative   Review: Reviewed   White Blood Cell - Urine: 60 /HPF   Red Blood Cell - Urine: 24 /HPF   Bacteria: Many /HPF   Cast: 2 /LPF   Epithelial Cells: >36 /HPF    Urine Cx: Culture - Urine (03.31.24 @ 01:23)    Specimen Source: Clean Catch Clean Catch (Midstream)   Culture Results:   <10,000 CFU/mL Normal Urogenital Leslie    RADIOLOGY:  CT AP pending  < from: US Kidney and Bladder (04.01.24 @ 11:30) >  ACC: [id number] EXAM:  US KIDNEYS AND BLADDER   ORDERED BY: FATIMAH A BALL     PROCEDURE DATE:  04/01/2024          INTERPRETATION:  CLINICAL INFORMATION: 68 year old female with UTI and   sepsis, evaluate for pyelonephritis    COMPARISON: CT abdomen and pelvis from 6/28/2011. Renal ultrasound   5/16/2011    TECHNIQUE: Sonography of the kidneys and bladder.    FINDINGS:  Right kidney: 11.5 cm. Diffuse cortical thinning. There is dilatation of   the collecting system, compatible with hydronephrosis.There is echogenic   material with associated posterior acoustic shadowing within the dilated   upper pole collecting system which may represent multiple small stones   and/or debris in the setting of infection.  There is a large echogenic focus in the region of the renal pelvis  and   central collecting system with shadowing measuring up to 2.2 x 1.2 x 1.6   cm, consistent with a large stone, possible a staghorn calculus.  Interpolar cyst measuring up to 2.3 x 1.4 x 1.7 cm. Lower pole avascular   complex cyst with internal echoes measuring up to 1.7 x 0.8 x 0.8 cm.    Left kidney: 11.1 cm. Diffuse cortical thinning. There are multiple   cystic structures present within the central sinus fat without dilatation   of the renal pelvis. These findings may represent multiple peripelvic   cysts. However, hydronephrosis is also difficult to exclude in this   setting. These are new findings compared to prior exam. Nonobstructive   lower pole calculus measuring up to 0.4 x 0.5 x 0.7 cm    Urinary bladder: Debris noted within the bladder. Neither ureteral jet   was demonstrated.    IMPRESSION:  Large calculus in the region of the renal pelvis/central collecting   system  of the right kidney, possible staghorn calculus, with associated   hydronephrosis. There is echogenic  shadowing material in the dilated   right upper pole collecting system which may represent additional  stones   and/or debris in the setting of infection.    The left kidney is notable  for multiple cystic structures present within   the central sinus fat without dilatation of the left  renal pelvis. These   findings may represent multiple peripelvic cysts. However, hydronephrosis   is  also difficult to exclude in this setting.  There is a nonobstructing   left lower pole calculus measuring up to 0.7 cm.    Complex cyst with low-level echoes exophytic from the lower pole of the   right kidney measuring up to 1.7 cm.    For further evaluation of these findings, renal stone protocol abdominal   pelvic CT is recommended. If pyelonephritis is of clinical concern,   contrast-enhanced CT or MRI could also be performed for further   evaluation.    Findings were discussed with NP Yolanda4/1/2024 3:13 PM by Dr. Stroud with   read back confirmation.    --- End of Report ---    < end of copied text >  < from: CT Abdomen and Pelvis w/o Cont (06.28.11 @ 13:30) >  PROCEDURE DATE:  Jun 28 2011     .    INTERPRETATION:  CT Abdomen/Pelvis 06/28/2011 at 1326 hours.    History:  Lipase elevated with abdominal distention.    No prior study is available for comparison.    Technique: Axial sections of the abdomen were obtained from the domes of   diaphragm to the pubic symphysis without the administration of oral and   intravenous contrast material. No intravenous contrast was administered   secondary elevatedcreatinine. No oral contrast was administered   secondary to altered mental status.    Findings:     Multiple subcutaneous hypodense nodules are identified likely related to   subcutaneous injections. Correlate with history.    The lung bases and visualized portions of the heart are unremarkable.    Evaluation of the abdominal viscera is limited by the lack of intravenous   contrast. The liver is normal in size and there are scattered low   attenuation lesions are at the liver, some of which represent cysts,   others of which are too small to adequately characterize. The gallbladder   is unremarkable and there is no biliary ductal dilatation. The spleen,   pancreas, and adrenals are normal.     Kidneys are normal in size. There is mild right-sided hydronephrosis with   no evidence of obstructing stone.    Examination of the bowel is unremarkable. The appendix is clearly   visualized, however there are no secondary signs of appendicitis..    There is no significant mesenteric, retroperitoneal or pelvic   lymphadenopathy.    The urinary bladder is collapsed a Brown catheter in place.    The osseous structures are normal.      IMPRESSION:      Right mild hydronephrosis with no evidence of obstructing stone.     Dr. Lynch discussed these findings with Dr. Johnson on 6/28/2011 at   1529 hours.      LATIA LYNCH M.D.; RADIOLOGY RESIDENT  BOB VILLELA M.D., ATTENDING RADIOLOGIST    This examination was interpreted on:  Jun 28 2011  1:46P.  This document   has been electronicallysigned. Jun 28 2011  3:46P.    < end of copied text >

## 2024-04-01 NOTE — PROGRESS NOTE ADULT - SUBJECTIVE AND OBJECTIVE BOX
DATE OF PROCEDURE:  03/11/2020    SURGEON:  Italo Quiñonez M.D.    PREOPERATIVE DIAGNOSIS:  Advanced left hip arthrosis (ICD 10 code M16.1).    POSTOPERATIVE DIAGNOSIS:  Advanced left hip arthrosis.    PROCEDURE PERFORMED:  Left total hip arthroplasty (CPT code 88275).    SURGEON:  Italo Quiñonez M.D.    ASSISTANTS:    1. MARQUISE Frazier.  2. MARQUISE Kwon.  3. Eric Ochoa SA.    ANESTHESIA TYPE:  Spinal.    SPECIMENS REMOVED:  Arthroplasty resection material.    ESTIMATED BLOOD LOSS:  Less than 300 cc.    COMPLICATIONS:  None.    IMPLANT SYSTEM USED:  Kervin Cup and Clemons Nephew Stem.    BEARING:  Oxinium or Ceramic on highly cross-linked polyethylene.    COMPONENTS USED:  Acetabular Component:  Uncemented Kervin TM CUP Size # 54 mm  with 2 adjuvant screws     Liner:  Highly cross-linked polyethylene or dual mobility.     Femoral Stem:  Smith Nephew SL-Plus ultra stem size # 7, standard offset, 12/14  taper.       Femoral Head:   Size:  36 mm diameter Ceramic material.   Neck length:  +2 mm.    INDICATIONS:  The patient presented with severe left hip pain and functional  limitations. Preoperative clinical evaluation and x-rays revealed advanced  arthritic changes of the hip.  After failing conservative management consisting  of pain medications and activity modifications, surgical treatment was  recommended.  Procedure, alternatives, postoperative course, and surgical risks  were discussed preoperatively.  Risks discussed included but were not limited  to bleeding, infection, pain, stiffness, instability, wear, loosening, damage  to nerves or blood vessels, thromboembolic disease, drop-foot, leg-length  inequality, dislocation, cardiac or other significant events related to the  physiologic stress of surgery, death and other related risks.  The patient also  understood that postoperative anemia due to acute blood loss is an expected and  common occurrence after major joint replacement surgery.  The  patient voiced an  understanding of these risks and benefits and elected to proceed with surgery  in an attempt to improve their lifestyle.  All questions were answered  preoperatively.     CMS MANDATED PREOPERATIVE CHECKLIST:    1. Patient History: Advanced degenerative joint disease confirmed by history  and physical with radiographic studies showing subchondral cysts, subchondral  sclerosis, periarticular osteophytes, and joint space narrowing of the hip.  2. Current Symptoms: Joint pain and dysfunction due to arthritis of the hip  joint.   3. Failed past conservative treatments:   For at least three months, the  patient has tried a combination of:   Anti-inflammatory medications;   Analgesic medications (acetaminophen, tramadol, or narcotics);   Activity modifications and weight loss using self-directed programs;  Directed physical therapy rehabilitation;   Walking assist devices (cane, walker, or wheelchair).     In cases of severe osteoarthritis with femoral head collapse with an acute or  longstanding inability to ambulate, the patient is treated as if having a  femoral neck fracture with urgent surgery bypassing futile conservative  management which would not be cost effective or indicated.     DESCRIPTION OF PROCEDURE:  The patient was placed in the supine position on the  operating room table.  Following administration of appropriate anesthesia, the  patient was placed in a lateral decubitus position on a padded peg board for  exposure of the operative hip.  Axillary roll and padding of bony prominences  were placed.  The hip and leg were prepped and draped with sterile technique  with the leg free.  The patient received prophylactic intravenous antibiotics  prior to skin incision.  Tranexamic acid was given intravenously for improved  hemostasis unless contraindicated.     A posterolateral modified Kocher-Valero exposure of the hip was utilized.  Skin, subcutaneous tissue, and fascia were incised at the  interval between  tensor fascia linda and gluteus ya.  The piriformis tendon was divided and  tagged at its trochanteric insertion, allowing posterior retraction for  protection of the sciatic nerve.  The gluteus minimus was elevated from  posterior capsule and retracted anteriorly.  A posterior capsulotomy in the  shape of a \"T\" was made, protecting gluteus minimus and dividing the short  external rotators at their trochanteric insertion.  Flaps were retracted,  allowing visualization of the femoral neck.  Neck osteotomy was performed using  an oscillating saw.  The femoral head was delivered from the wound, and the  femur retracted anteriorly.     Attention was turned to the acetabulum.  Labrum and soft tissue were removed  from the bony acetabular rim.  Central osteophytes and pulvinar soft tissue  were removed from the Haversian notch.  Remaining cartilage was removed by  curettage and initial power reaming.  Successive power reaming was continued to  an appropriate size at 2 mm and 1 mm increments, exposing subchondral bleeding  bone.  If needed, granulation tissue from multiple subchondral cysts was  removed by curettage and morselized cancellous bone graft harvested from  femoral neck was packed and impacted into acetabular cysts and irregularities.     The acetabulum was irrigated with antibiotic solution.  The acetabular  component was impacted into place.  Appropriate component fixation, position,  and coverage were confirmed.  Two adjunctive dome screws were used except in  cases where the acetabular component had an extremely tight fit and screws were  not deemed necessary.  Remaining peripheral acetabular rim osteophytes were  removed.  The acetabulum was protected with a sponge and attention turned to  the femur     The superior lateral femoral neck was osteotomized with a rongeur.  The  intramedullary canal was identified with a blunt .  The canal was  irrigated of fat and debris.  The  [facility] DISE[facility]ES  WESLEY Corrales Y. Patel, S. Shah, G. [facility]  [phone]  ([phone] - weekdays after 5pm and weekends)    Name: RITU HANNON  Age/Gender: 68y Female  MRN: [id number]    Interval History:  Patient seen and examined this morning.   Feels better today, feels back to her baseline.   No new complaints noted.  Notes reviewed  No concerning overnight events  Afebrile   Allergies: shellfish. (Unknown)  sulfa drugs (Unknown)  shellfish (Unknown)      Objective:  Vitals:   T(F): 98.5 (04-01-24 @ 12:15), Max: 99.1 (04-01-24 @ 00:32)  HR: 111 (04-01-24 @ 12:15) (90 - 124)  BP: 145/73 (04-01-24 @ 12:15) (145/73 - 165/84)  RR: 18 (04-01-24 @ 12:15) (18 - 18)  SpO2: 96% (04-01-24 @ 12:15) (96% - 99%)  Physical Examination:  General: no acute distress  HEENT: NC/AT, anicteric, neck supple  Respiratory: clear to auscultation bilaterally  Cardiovascular: S1 and S2 present, normal rate   Gastrointestinal: soft, nontender, nondistended  Extremities: no edema, no cyanosis  Skin: no visible rash    Laboratory Studies:  CBC:                       9.5    12.07 )-----------( 234      ( 01 Apr 2024 07:12 )             30.1     WBC Trend:  12.07 04-01-24 @ 07:12  24.97 03-31-24 @ 07:32  23.91 03-31-24 @ 00:02    CMP: 04-01    138  |  101  |  15  ----------------------------<  97  4.0   |  22  |  0.91    Ca    8.7      01 Apr 2024 07:10    TPro  6.3  /  Alb  3.1<L>  /  TBili  0.3  /  DBili  x   /  AST  10  /  ALT  7<L>  /  AlkPhos  66  04-01    Creatinine: 0.91 mg/dL (04-01-24 @ 07:10)  Creatinine: 1.00 mg/dL (03-31-24 @ 07:32)  Creatinine: 1.04 mg/dL (03-31-24 @ 00:02)    LIVER FUNCTIONS - ( 01 Apr 2024 07:10 )  Alb: 3.1 g/dL / Pro: 6.3 g/dL / ALK PHOS: 66 U/L / ALT: 7 U/L / AST: 10 U/L / GGT: x           Microbiology: reviewed   Culture - Urine (collected 03-31-24 @ 01:23)  Source: Clean Catch Clean Catch (Midstream)  Final Report (04-01-24 @ 08:25):    <10,000 CFU/mL Normal Urogenital Leslie    Culture - Blood (collected 03-31-24 @ 00:50)  Source: .Blood Blood-Peripheral  Preliminary Report (04-01-24 @ 07:02):    No growth at 24 hours    Culture - Blood (collected 03-31-24 @ 00:35)  Source: .Blood Blood-Peripheral  Preliminary Report (04-01-24 @ 07:02):    No growth at 24 hours    SARS-CoV-2 Result: NotDetec (31 Mar 2024 00:59)    Radiology: reviewed     Medications:  acetaminophen     Tablet .. 650 milliGRAM(s) Oral every 6 hours PRN  atorvastatin 40 milliGRAM(s) Oral at bedtime  baclofen 30 milliGRAM(s) Oral <User Schedule>  cefTRIAXone   IVPB 1000 milliGRAM(s) IV Intermittent every 24 hours  cholecalciferol 2000 Unit(s) Oral daily  melatonin 3 milliGRAM(s) Oral at bedtime PRN  ondansetron Injectable 4 milliGRAM(s) IV Push every 8 hours PRN  sodium chloride 0.9%. 1000 milliLiter(s) IV Continuous <Continuous>  sodium chloride 0.9%. 1000 milliLiter(s) IV Continuous <Continuous>  tiZANidine 8 milliGRAM(s) Oral at bedtime    Current Antimicrobials:  cefTRIAXone   IVPB 1000 milliGRAM(s) IV Intermittent every 24 hours    Prior/Completed Antimicrobials:  cefTRIAXone   IVPB   internal greater trochanter was debrided of  bone with a curette.  The proximal femur was rasped with serial broaches using  an air powered Woodpecker broaching.  Appropriate broach fixation and  appropriately anteverted position was confirmed.  Trials of various head length  were added.  Trial reduction was performed and evaluated for stability/length.     Trial femoral components were removed.  Attention was redirected to the  acetabulum.  A sterile cocktail of epinephrine, ketorolac, bupivacaine,  morphine, and saline or Exparel was injected into the hip capsule and  surrounding structures for local analgesia and anesthesia.  The liner was then  locked into place and the fit was tested with an elevator.  Attention was then  redirected to the proximal femur.  The femoral stem was then press-fit into the  femur using controlled gentle impaction.  A good fit and anteversion position  was noted. The femoral head was then impacted onto the trunnion and femoral  head component fixation was confirmed.     The acetabular component was cleared of debris and irrigated.  Hip reduction  was performed and appropriate range of motion and optimal stability/leg length  balance again confirmed.  The capsule was repaired and the piriformis tendon  was attached to the posterior and superior greater trochanter using interrupted  #1 suture placed inside to out through the gluteus medius insertion.  The  fascia linda was closed with interrupted absorbable #1 suture.  The subcutaneous  tissue was approximated in two layers with interrupted absorbable 2-0 suture  and 3-0 Quill suture, and skin eversion and apposition was confirmed with skin  staples.  An Aquacel dressing was placed, followed by an abduction pillow.  The  patient was returned to the supine position in bed, awakened, and removed to  the recovery room in stable condition.     Hemostasis was maintained throughout the procedure and prior to closure using  electrocautery.   Pulsatile lavage and antibiotic irrigation was used throughout  the procedure and prior to closure of each layer.  Body exhaust systems for the  surgical team and laminar airflow were used throughout the procedure.       _____________________ ____________________________________  DATE AND TIME BA Fairchild/HARRY-#[id number]  DD:  03/11/2020 13:40:40      DT:  03/11/2020 20:49:16        [facility]                      MRN#: [id number]                               ROOM: [id number]                                ACCT#: [id number]  REPORT OF OPERATION

## 2024-04-01 NOTE — PHARMACOTHERAPY INTERVENTION NOTE - COMMENTS
Medication Reconciliation completed for patient.  These were confirmed with patient, family member and pharmacy.  Updated medications are reflected in Outpatient Medication Review.     Home Medications:  Albuterol (Eqv-ProAir HFA) 90 mcg/inh inhalation aerosol: 2 puff(s) inhaled every 6 hours as needed for shortness of breath and/or wheezing  baclofen 10 mg oral tablet: 3 tab(s) orally every 6 hours   CellCept 500 mg oral tablet: 2 tab(s) orally 2 times a day   cholecalciferol 50 mcg (2000 intl units) oral tablet: 1 tab(s) orally   Detrol LA 4 mg oral capsule, extended release: 1 cap(s) orally once a day   Zanaflex 4 mg oral tablet: 2 tab(s) orally once a day (at bedtime)   Zocor 10 mg oral tablet: 1 tab(s) orally once a day     Tyler Tamakuwala, PharmD  PGY1 Pharmacy Resident  Available on Teams

## 2024-04-02 LAB
ANION GAP SERPL CALC-SCNC: 17 MMOL/L — SIGNIFICANT CHANGE UP (ref 5–17)
BUN SERPL-MCNC: 14 MG/DL — SIGNIFICANT CHANGE UP (ref 7–23)
CALCIUM SERPL-MCNC: 8.8 MG/DL — SIGNIFICANT CHANGE UP (ref 8.4–10.5)
CHLORIDE SERPL-SCNC: 103 MMOL/L — SIGNIFICANT CHANGE UP (ref 96–108)
CO2 SERPL-SCNC: 21 MMOL/L — LOW (ref 22–31)
CREAT SERPL-MCNC: 0.65 MG/DL — SIGNIFICANT CHANGE UP (ref 0.5–1.3)
EGFR: 95 ML/MIN/1.73M2 — SIGNIFICANT CHANGE UP
GLUCOSE SERPL-MCNC: 100 MG/DL — HIGH (ref 70–99)
HCT VFR BLD CALC: 32.3 % — LOW (ref 34.5–45)
HGB BLD-MCNC: 9.9 G/DL — LOW (ref 11.5–15.5)
MCHC RBC-ENTMCNC: 26.3 PG — LOW (ref 27–34)
MCHC RBC-ENTMCNC: 30.7 GM/DL — LOW (ref 32–36)
MCV RBC AUTO: 85.9 FL — SIGNIFICANT CHANGE UP (ref 80–100)
NRBC # BLD: 0 /100 WBCS — SIGNIFICANT CHANGE UP (ref 0–0)
PLATELET # BLD AUTO: 265 K/UL — SIGNIFICANT CHANGE UP (ref 150–400)
POTASSIUM SERPL-MCNC: 4.4 MMOL/L — SIGNIFICANT CHANGE UP (ref 3.5–5.3)
POTASSIUM SERPL-SCNC: 4.4 MMOL/L — SIGNIFICANT CHANGE UP (ref 3.5–5.3)
RBC # BLD: 3.76 M/UL — LOW (ref 3.8–5.2)
RBC # FLD: 15.8 % — HIGH (ref 10.3–14.5)
SODIUM SERPL-SCNC: 141 MMOL/L — SIGNIFICANT CHANGE UP (ref 135–145)
WBC # BLD: 13.12 K/UL — HIGH (ref 3.8–10.5)
WBC # FLD AUTO: 13.12 K/UL — HIGH (ref 3.8–10.5)

## 2024-04-02 PROCEDURE — 99232 SBSQ HOSP IP/OBS MODERATE 35: CPT | Mod: 25

## 2024-04-02 RX ORDER — TIZANIDINE 4 MG/1
8 TABLET ORAL AT BEDTIME
Refills: 0 | Status: DISCONTINUED | OUTPATIENT
Start: 2024-04-02 | End: 2024-04-04

## 2024-04-02 RX ORDER — BACLOFEN 100 %
30 POWDER (GRAM) MISCELLANEOUS
Refills: 0 | Status: DISCONTINUED | OUTPATIENT
Start: 2024-04-03 | End: 2024-04-04

## 2024-04-02 RX ORDER — BACLOFEN 100 %
30 POWDER (GRAM) MISCELLANEOUS
Refills: 0 | Status: DISCONTINUED | OUTPATIENT
Start: 2024-04-02 | End: 2024-04-02

## 2024-04-02 RX ADMIN — Medication 5 MILLIGRAM(S): at 05:44

## 2024-04-02 RX ADMIN — CEFTRIAXONE 100 MILLIGRAM(S): 500 INJECTION, POWDER, FOR SOLUTION INTRAMUSCULAR; INTRAVENOUS at 01:51

## 2024-04-02 RX ADMIN — ATORVASTATIN CALCIUM 40 MILLIGRAM(S): 80 TABLET, FILM COATED ORAL at 22:02

## 2024-04-02 RX ADMIN — Medication 2000 UNIT(S): at 12:49

## 2024-04-02 RX ADMIN — TIZANIDINE 8 MILLIGRAM(S): 4 TABLET ORAL at 22:02

## 2024-04-02 RX ADMIN — SODIUM CHLORIDE 75 MILLILITER(S): 9 INJECTION INTRAMUSCULAR; INTRAVENOUS; SUBCUTANEOUS at 12:50

## 2024-04-02 RX ADMIN — Medication 5 MILLIGRAM(S): at 17:39

## 2024-04-02 NOTE — PROGRESS NOTE ADULT - SUBJECTIVE AND OBJECTIVE BOX
Urology Progress Note     Subjective/24hour Events:   Patient seen and examined.   No acute events overnight. Doing well. tachycardia resolved. Afebrile.   Pain controlled.     Vital Signs:  Vital Signs Last 24 Hrs  T(C): 36.7 (02 Apr 2024 04:41), Max: 37 (01 Apr 2024 20:11)  T(F): 98.1 (02 Apr 2024 04:41), Max: 98.6 (01 Apr 2024 20:11)  HR: 97 (02 Apr 2024 04:41) (90 - 111)  BP: 161/75 (02 Apr 2024 04:41) (145/73 - 175/75)  BP(mean): 107 (02 Apr 2024 01:15) (103 - 709)  RR: 17 (02 Apr 2024 04:41) (14 - 18)  SpO2: 95% (02 Apr 2024 04:41) (93% - 98%)    Parameters below as of 02 Apr 2024 04:41  Patient On (Oxygen Delivery Method): room air        CAPILLARY BLOOD GLUCOSE          I&O's Detail    01 Apr 2024 07:01  -  02 Apr 2024 07:00  --------------------------------------------------------  IN:    Oral Fluid: 120 mL    sodium chloride 0.9%: 150 mL  Total IN: 270 mL    OUT:    Voided (mL): 1700 mL  Total OUT: 1700 mL    Total NET: -1430 mL          MEDICATIONS  (STANDING):  atorvastatin 40 milliGRAM(s) Oral at bedtime  baclofen 5 milliGRAM(s) Oral every 12 hours  cefTRIAXone   IVPB 1000 milliGRAM(s) IV Intermittent every 24 hours  cholecalciferol 2000 Unit(s) Oral daily  sodium chloride 0.9%. 1000 milliLiter(s) (75 mL/Hr) IV Continuous <Continuous>  sodium chloride 0.9%. 1000 milliLiter(s) (75 mL/Hr) IV Continuous <Continuous>    MEDICATIONS  (PRN):  acetaminophen     Tablet .. 650 milliGRAM(s) Oral every 6 hours PRN Temp greater or equal to 38C (100.4F), Mild Pain (1 - 3)  melatonin 3 milliGRAM(s) Oral at bedtime PRN Insomnia  ondansetron Injectable 4 milliGRAM(s) IV Push every 8 hours PRN Nausea and/or Vomiting      Physical Exam:  Gen: NAD. Resting comfortably  Lungs: Non labored breathing.   Ab: Soft, nontender, nondistended. No CVAT  : VOiding freely    Labs:    04-01    138  |  101  |  15  ----------------------------<  97  4.0   |  22  |  0.91    Ca    8.7      01 Apr 2024 07:10    TPro  6.3  /  Alb  3.1<L>  /  TBili  0.3  /  DBili  x   /  AST  10  /  ALT  7<L>  /  AlkPhos  66  04-01    LIVER FUNCTIONS - ( 01 Apr 2024 07:10 )  Alb: 3.1 g/dL / Pro: 6.3 g/dL / ALK PHOS: 66 U/L / ALT: 7 U/L / AST: 10 U/L / GGT: x                                 9.5    12.07 )-----------( 234      ( 01 Apr 2024 07:12 )             30.1       < from: CT Abdomen and Pelvis No Cont (04.01.24 @ 17:43) >    ******PRELIMINARY REPORT******      ******PRELIMINARY REPORT******         ACC: [id number] EXAM:  CT ABDOMEN AND PELVIS   ORDERED BY: BLAKE GAMBINO     PROCEDURE DATE:  04/01/2024    ******PRELIMINARY REPORT******      ******PRELIMINARY REPORT******           INTERPRETATION:  CLINICAL INFORMATION: UTI/sepsis. Renal stones on   ultrasound.    COMPARISON: Renal ultrasound 3/31/2024. CT abdomen pelvis 6/28/2011    CONTRAST/COMPLICATIONS:  IV Contrast: NONE  Oral Contrast: NONE  Complications: None reported at time of study completion    IMPRESSION:  Moderate left hydroureteronephrosis to the level of an obstructing 4 mm   calculus within the distal left ureter (3, 98).    Multiple nonobstructing right renal calculi, largest in the pelvis   measures up to 2.3 cm (3, 65). No right hydronephrosis.    If there is a change in patient clinical status then obtain repeat imaging    Follow up official report in the AM            ******PRELIMINARY REPORT******      ******PRELIMINARY REPORT******        MICHAEL POSADA MD; Resident Radiologist  This document is a PRELIMINARY interpretation and is pending final   attending approval. Apr 1 2024  9:43PM    < end of copied text >

## 2024-04-02 NOTE — DIETITIAN INITIAL EVALUATION ADULT - OTHER CALCULATIONS
Defer fluid needs to team.  Estimated nutrient needs based on RD obtained current weight 134.3lb, with consideration for skin integrity

## 2024-04-02 NOTE — DIETITIAN INITIAL EVALUATION ADULT - PERSON TAUGHT/METHOD
Educated pt on the importance of consuming a diet adequate in protein and micronutrients for wound healing/skin integrity.  Encouraged adequate protein-energy intake, provided patient with menu. Patient without specific food preferences for RD at this time. Made aware RD to remain available./verbal instruction/patient instructed

## 2024-04-02 NOTE — PROGRESS NOTE ADULT - SUBJECTIVE AND OBJECTIVE BOX
Patient is a 68y old  Female who presents with a chief complaint of Disorientation    SUBJECTIVE / OVERNIGHT EVENTS:    Patient seen and examined. yesterday us shows obstructed stones, sp Cystoscopy, bl ureteral stent placement last night.      Vital Signs Last 24 Hrs  T(C): 36.7 (02 Apr 2024 04:41), Max: 37 (01 Apr 2024 20:11)  T(F): 98.1 (02 Apr 2024 04:41), Max: 98.6 (01 Apr 2024 20:11)  HR: 97 (02 Apr 2024 04:41) (90 - 111)  BP: 161/75 (02 Apr 2024 04:41) (145/73 - 175/75)  BP(mean): 107 (02 Apr 2024 01:15) (103 - 709)  RR: 17 (02 Apr 2024 04:41) (14 - 18)  SpO2: 95% (02 Apr 2024 04:41) (93% - 98%)    Parameters below as of 02 Apr 2024 04:41  Patient On (Oxygen Delivery Method): room air      I&O's Summary    01 Apr 2024 07:01  -  02 Apr 2024 07:00  --------------------------------------------------------  IN: 270 mL / OUT: 1700 mL / NET: -1430 mL        PE:  GENERAL: NAD, AAOx1  CHEST/LUNG: CTABL, No wheeze  HEART: Regular rate and rhythm; no murmur  ABDOMEN: Soft, Nontender, Nondistended; Bowel sounds present  EXTREMITIES:  2+ Peripheral Pulses, No edema  NEURO: No focal deficits    LABS:                        9.9    13.12 )-----------( 265      ( 02 Apr 2024 07:25 )             32.3     04-02    141  |  103  |  14  ----------------------------<  100<H>  4.4   |  21<L>  |  0.65    Ca    8.8      02 Apr 2024 07:22    TPro  6.3  /  Alb  3.1<L>  /  TBili  0.3  /  DBili  x   /  AST  10  /  ALT  7<L>  /  AlkPhos  66  04-01      CAPILLARY BLOOD GLUCOSE            Urinalysis Basic - ( 02 Apr 2024 07:22 )    Color: x / Appearance: x / SG: x / pH: x  Gluc: 100 mg/dL / Ketone: x  / Bili: x / Urobili: x   Blood: x / Protein: x / Nitrite: x   Leuk Esterase: x / RBC: x / WBC x   Sq Epi: x / Non Sq Epi: x / Bacteria: x        RADIOLOGY & ADDITIONAL TESTS:    Imaging Personally Reviewed:  [x] YES  [ ] NO    Consultant(s) Notes Reviewed:  [x] YES  [ ] NO    MEDICATIONS  (STANDING):  atorvastatin 40 milliGRAM(s) Oral at bedtime  baclofen 5 milliGRAM(s) Oral every 12 hours  cefTRIAXone   IVPB 1000 milliGRAM(s) IV Intermittent every 24 hours  cholecalciferol 2000 Unit(s) Oral daily  sodium chloride 0.9%. 1000 milliLiter(s) (75 mL/Hr) IV Continuous <Continuous>  sodium chloride 0.9%. 1000 milliLiter(s) (75 mL/Hr) IV Continuous <Continuous>    MEDICATIONS  (PRN):  acetaminophen     Tablet .. 650 milliGRAM(s) Oral every 6 hours PRN Temp greater or equal to 38C (100.4F), Mild Pain (1 - 3)  melatonin 3 milliGRAM(s) Oral at bedtime PRN Insomnia  ondansetron Injectable 4 milliGRAM(s) IV Push every 8 hours PRN Nausea and/or Vomiting      Care Discussed with Consultants/Other Providers [x] YES  [ ] NO    HEALTH ISSUES - PROBLEM Dx:  Sepsis secondary to UTI    Multiple sclerosis    Need for prophylactic measure    AMS (altered mental status)         Patient is a 68y old  Female who presents with a chief complaint of Disorientation    SUBJECTIVE / OVERNIGHT EVENTS:    Patient seen and examined. yesterday us shows obstructed stones, sp Cystoscopy, bl ureteral stent placement last night.  pt without complaints.  at bedside.    Vital Signs Last 24 Hrs  T(C): 36.7 (02 Apr 2024 04:41), Max: 37 (01 Apr 2024 20:11)  T(F): 98.1 (02 Apr 2024 04:41), Max: 98.6 (01 Apr 2024 20:11)  HR: 97 (02 Apr 2024 04:41) (90 - 111)  BP: 161/75 (02 Apr 2024 04:41) (145/73 - 175/75)  BP(mean): 107 (02 Apr 2024 01:15) (103 - 709)  RR: 17 (02 Apr 2024 04:41) (14 - 18)  SpO2: 95% (02 Apr 2024 04:41) (93% - 98%)    Parameters below as of 02 Apr 2024 04:41  Patient On (Oxygen Delivery Method): room air      I&O's Summary    01 Apr 2024 07:01  -  02 Apr 2024 07:00  --------------------------------------------------------  IN: 270 mL / OUT: 1700 mL / NET: -1430 mL        PE:  GENERAL: NAD, AAOx2  CHEST/LUNG: CTABL, No wheeze  HEART: Regular rate and rhythm; no murmur  ABDOMEN: Soft, Nontender, Nondistended; Bowel sounds present  gu dark urine  EXTREMITIES:  2+ Peripheral Pulses, No edema  NEURO: No focal deficits    LABS:                        9.9    13.12 )-----------( 265      ( 02 Apr 2024 07:25 )             32.3     04-02    141  |  103  |  14  ----------------------------<  100<H>  4.4   |  21<L>  |  0.65    Ca    8.8      02 Apr 2024 07:22    TPro  6.3  /  Alb  3.1<L>  /  TBili  0.3  /  DBili  x   /  AST  10  /  ALT  7<L>  /  AlkPhos  66  04-01      CAPILLARY BLOOD GLUCOSE            Urinalysis Basic - ( 02 Apr 2024 07:22 )    Color: x / Appearance: x / SG: x / pH: x  Gluc: 100 mg/dL / Ketone: x  / Bili: x / Urobili: x   Blood: x / Protein: x / Nitrite: x   Leuk Esterase: x / RBC: x / WBC x   Sq Epi: x / Non Sq Epi: x / Bacteria: x        RADIOLOGY & ADDITIONAL TESTS:    Imaging Personally Reviewed:  [x] YES  [ ] NO    Consultant(s) Notes Reviewed:  [x] YES  [ ] NO    MEDICATIONS  (STANDING):  atorvastatin 40 milliGRAM(s) Oral at bedtime  baclofen 5 milliGRAM(s) Oral every 12 hours  cefTRIAXone   IVPB 1000 milliGRAM(s) IV Intermittent every 24 hours  cholecalciferol 2000 Unit(s) Oral daily  sodium chloride 0.9%. 1000 milliLiter(s) (75 mL/Hr) IV Continuous <Continuous>  sodium chloride 0.9%. 1000 milliLiter(s) (75 mL/Hr) IV Continuous <Continuous>    MEDICATIONS  (PRN):  acetaminophen     Tablet .. 650 milliGRAM(s) Oral every 6 hours PRN Temp greater or equal to 38C (100.4F), Mild Pain (1 - 3)  melatonin 3 milliGRAM(s) Oral at bedtime PRN Insomnia  ondansetron Injectable 4 milliGRAM(s) IV Push every 8 hours PRN Nausea and/or Vomiting      Care Discussed with Consultants/Other Providers [x] YES  [ ] NO    HEALTH ISSUES - PROBLEM Dx:  Sepsis secondary to UTI    Multiple sclerosis    Need for prophylactic measure    AMS (altered mental status)

## 2024-04-02 NOTE — DIETITIAN INITIAL EVALUATION ADULT - NSFNSPHYEXAMSKINFT_GEN_A_CORE
Per Wound Care note 4/1:  "on the sacrum and b/l buttocks was a healed wound with scar tissue, intact pink skin and dry flaking skin. pt reports she had a wound there 11 years ago and its been healed since.   The b/l heels present with blanchable erythema which may be an early sign of deep tissue injury."

## 2024-04-02 NOTE — DIETITIAN INITIAL EVALUATION ADULT - ADD RECOMMEND
-Continue diet free of therapeutic restrictions, diet texture per SLP/team.  -RD to add Mighty Shake 1x/day (200 chucky, 7 Gm protein) to assist with protein-energy intake.  -Consider swallow evaluation to determine appropriate diet texture/consistency for patient as patient eating food from home.  -Recommend adding multivitamin and vitamin C for wound healing pending no medical contraindications.  -Monitor PO intake, diet, weight, labs, skin, GI symptoms, and BM regularity.  -RD remains available upon request and will follow up per protocol.

## 2024-04-02 NOTE — DIETITIAN INITIAL EVALUATION ADULT - OTHER INFO
Patient reports -130lb, reports no recent changes in weight PTA and that clothes fitting same as usual PTA. Patient verbalized wanting to lose weight.  Dosing weight: 136.6lb (3/31, bed).  RD obtained bed weight: 60.9kg/134.3lb (4/2).  IBW: 100lb, %IBW: 134% based on RD obtained current weight.  [facility]  Weight appears stable. RD to continue to monitor weight trends as available/able.     -UTI per chart. Ordered for IV antibiotics.  -Ordered for cholecalciferol.  -IV Fluids ordered: NaCl 0.9% @ 75ml/hr. Patient reports -130lb, reports no recent changes in weight PTA and that clothes fitting same as usual PTA. Patient verbalized wanting to lose weight.  Dosing weight: 136.6lb (3/31, bed).  RD obtained bed weight: 60.9kg/134.3lb (4/2).  IBW: 100lb, %IBW: 134% based on RD obtained current weight.  Weight history per [facility] HIE: 125lb (1/30/24).  Weight appears stable. RD to continue to monitor weight trends as available/able.     -Per chart:" Sepsis secondary to pyelo; obstructed UVJ stone sp Cystoscopy with bl ureteral stent placement 4/1."  -Ordered for IV antibiotics.  -Ordered for cholecalciferol.  -IV Fluids ordered: NaCl 0.9% @ 75ml/hr.

## 2024-04-02 NOTE — DIETITIAN INITIAL EVALUATION ADULT - PROBLEM SELECTOR PLAN 2
improved w/ treatment of UTI , now back to baseline , CT head w/ no acute findings  - continue above tx

## 2024-04-02 NOTE — DIETITIAN INITIAL EVALUATION ADULT - PERTINENT LABORATORY DATA
04-02    141  |  103  |  14  ----------------------------<  100<H>  4.4   |  21<L>  |  0.65    Ca    8.8      02 Apr 2024 07:22    TPro  6.3  /  Alb  3.1<L>  /  TBili  0.3  /  DBili  x   /  AST  10  /  ALT  7<L>  /  AlkPhos  66  04-01

## 2024-04-02 NOTE — PHYSICAL THERAPY INITIAL EVALUATION ADULT - ACTIVE RANGE OF MOTION EXAMINATION, REHAB EVAL
R shoulder flexion WFL, L shoulder flexion <50% of range, B/L bicep/tricep <50% of full range, R wrist WFL, L wrist limited to 10% of range, B/L finger flex/ext contracted

## 2024-04-02 NOTE — DIETITIAN INITIAL EVALUATION ADULT - REASON INDICATOR FOR ASSESSMENT
RD assessment warranted for: Consult for Pressure injury stage 2 or >. Chart reviewed, events noted.  Source: medical record, patient, RN.

## 2024-04-02 NOTE — PROGRESS NOTE ADULT - ASSESSMENT
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    Sepsis due to obstructive pyelonephritis  Constipation  - AMS with fever and leukocytosis    -- mental status improved, afebrile now and WBC downtrending   - UA with some pyuria but contaminated specimen (>36 sq epi cells)  - Ucx negative   - renal/bladder US showed R large renal stone possible staghorn with hydronephrosis with possible additional stones or debri; possible multiple L peripelvic cysts, nonobstructing left lower pole stone  - CT stone hunt with 0.4cm obstructive stone at L distal UVJ with mild L hydronephrosis, 2.1cm R renal pelvic stone with mild R hydronephrosis, additional staghorn stones in upper pole 1.4cm  - Urology took for b/l ureteral stent placement 4/2 overnight     Recommendations:  Follow blood cultures - NGTD x2   Follow OR urine cultures, in process   Continue ceftriaxone 1g IV Q24h for now pending above   Monitor temps/WBC  Bowel regimen and rest of care per primary team    Osiel Jiang M.D.  [facility], Division of Infectious Diseases  [phone]  After 5pm on weekdays and all day on weekends - please call [phone]

## 2024-04-02 NOTE — PHYSICAL THERAPY INITIAL EVALUATION ADULT - PERTINENT HX OF CURRENT PROBLEM, REHAB EVAL
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 Per  , patient was confused with slurred speech, repeating herself , and disoriented. She has similar symptoms in the past with infections. She was recently treated with a five day course of nitrofurantoin for UTI about one week prior to admission. Patient has no sensation and therefore no symptoms of dysuria. No reports fever or chills. No cough or nasal congestion. No open skin wounds. She was recently constipated , but treated with a laxative and suppository with relief of symptoms. CT head (3/31) No acute intracranial pathology. Bifrontal atrophy again appreciated. XR chest (3/31) Clear lungs, findings unchanged. US kidney/bladder (4/1) Large calculus in the region of the renal pelvis/central collecting system  of the right kidney, possible staghorn calculus, with associated hydronephrosis. There is echogenic  shadowing material in the dilated right upper pole collecting system which may represent additional  stones and/or debris in the setting of infection. The left kidney is notable  for multiple cystic structures present within the central sinus fat without dilatation of the left  renal pelvis. These findings may represent multiple peripelvic cysts. However, hydronephrosis is  also difficult to exclude in this setting.  There is a nonobstructing left lower pole calculus measuring up to 0.7 cm. CT abdomen/pelvis (4/1) A 0.4 cm obstructive stone at the left distal UVJ with mild left hydronephrosis. A 2.1 cm right renal pelvic stone with mild right hydronephrosis. Additional staghorn stones in the upper pole renal calyx measuring 1.4 cm.

## 2024-04-02 NOTE — DIETITIAN INITIAL EVALUATION ADULT - PERTINENT MEDS FT
MEDICATIONS  (STANDING):  atorvastatin 40 milliGRAM(s) Oral at bedtime  baclofen 5 milliGRAM(s) Oral every 12 hours  cefTRIAXone   IVPB 1000 milliGRAM(s) IV Intermittent every 24 hours  cholecalciferol 2000 Unit(s) Oral daily  sodium chloride 0.9%. 1000 milliLiter(s) (75 mL/Hr) IV Continuous <Continuous>  sodium chloride 0.9%. 1000 milliLiter(s) (75 mL/Hr) IV Continuous <Continuous>    MEDICATIONS  (PRN):  acetaminophen     Tablet .. 650 milliGRAM(s) Oral every 6 hours PRN Temp greater or equal to 38C (100.4F), Mild Pain (1 - 3)  melatonin 3 milliGRAM(s) Oral at bedtime PRN Insomnia  ondansetron Injectable 4 milliGRAM(s) IV Push every 8 hours PRN Nausea and/or Vomiting   MEDICATIONS  (STANDING):  atorvastatin 40 milliGRAM(s) Oral at bedtime  baclofen 5 milliGRAM(s) Oral every 12 hours  cefTRIAXone   IVPB 1000 milliGRAM(s) IV Intermittent every 24 hours  cholecalciferol 2000 Unit(s) Oral daily  sodium chloride 0.9%. 1000 milliLiter(s) (75 mL/Hr) IV Continuous <Continuous>    MEDICATIONS  (PRN):  acetaminophen     Tablet .. 650 milliGRAM(s) Oral every 6 hours PRN Temp greater or equal to 38C (100.4F), Mild Pain (1 - 3)  melatonin 3 milliGRAM(s) Oral at bedtime PRN Insomnia  ondansetron Injectable 4 milliGRAM(s) IV Push every 8 hours PRN Nausea and/or Vomiting

## 2024-04-02 NOTE — DIETITIAN INITIAL EVALUATION ADULT - HEIGHT FOR BMI (INCHES)
Dorantes cath removed as per DR Kraft to void trial, pt tolerated procedure well. 1000 mL of clear yellow urine emptied. No antipyretics ordered at this time - to recheck pt's temp in 60 min. Pt had an episode of incontinence, sheets changed, lee care provided. DR Goldberg notified. RVP results noted, droplet precautions in effect. Pt medicated as ordered. Received pt with 14 Fr.  brice intact and draining. 0

## 2024-04-02 NOTE — DIETITIAN INITIAL EVALUATION ADULT - NSFNSGIIOFT_GEN_A_CORE
Patient reports no nausea/vomiting; reports constipation; last BM yesterday at kidney scan per patient; bowel regimen: none

## 2024-04-02 NOTE — PHYSICAL THERAPY INITIAL EVALUATION ADULT - ADDITIONAL COMMENTS
Pt resides in a private home with her . Pt requires assistance from  for all functional mobility/ADLs 2/2 hx of MS. Pt is able to stand pivot transfer with assistance from . Pt owns all necessary DME -chair lift, w/c, RW, commode, standing frame, shower chair

## 2024-04-02 NOTE — PROGRESS NOTE ADULT - SUBJECTIVE AND OBJECTIVE BOX
[facility] DIVISION OF INFECTIOUS DISEASES  WESLEY Corrales Y. Patel, S. Shah, G. Juma  [phone]  ([phone] - weekdays after 5pm and weekends)    Name: RITU HANNON  Age/Gender: 68y Female  MRN: [id number]    Interval History:  Patient seen and examined this morning.   Resting comfortably, no new complaits.   Notes reviewed, events noted, s/p b/l ureteral stents.   Afebrile     Allergies: shellfish. (Unknown)  sulfa drugs (Unknown)  shellfish (Unknown)      Objective:  Vitals:   T(F): 98.9 (04-02-24 @ 12:06), Max: 98.9 (04-02-24 @ 12:06)  HR: 100 (04-02-24 @ 12:06) (90 - 109)  BP: 146/74 (04-02-24 @ 12:06) (146/74 - 175/75)  RR: 18 (04-02-24 @ 12:06) (14 - 18)  SpO2: 93% (04-02-24 @ 12:06) (93% - 98%)  Physical Examination:  General: no acute distress  HEENT: NC/AT, anicteric, neck supple  Respiratory: no acc muscle use, breathing comfortably  Cardiovascular: S1 and S2 present  Gastrointestinal: soft, nontender, nondistended  Extremities: no edema, no cyanosis  Skin: no visible rash    Laboratory Studies:  CBC:                       9.9    13.12 )-----------( 265      ( 02 Apr 2024 07:25 )             32.3     WBC Trend:  13.12 04-02-24 @ 07:25  12.07 04-01-24 @ 07:12  24.97 03-31-24 @ 07:32  23.91 03-31-24 @ 00:02    CMP: 04-02    141  |  103  |  14  ----------------------------<  100<H>  4.4   |  21<L>  |  0.65    Ca    8.8      02 Apr 2024 07:22    TPro  6.3  /  Alb  3.1<L>  /  TBili  0.3  /  DBili  x   /  AST  10  /  ALT  7<L>  /  AlkPhos  66  04-01    Creatinine: 0.65 mg/dL (04-02-24 @ 07:22)  Creatinine: 0.91 mg/dL (04-01-24 @ 07:10)  Creatinine: 1.00 mg/dL (03-31-24 @ 07:32)  Creatinine: 1.04 mg/dL (03-31-24 @ 00:02)      LIVER FUNCTIONS - ( 01 Apr 2024 07:10 )  Alb: 3.1 g/dL / Pro: 6.3 g/dL / ALK PHOS: 66 U/L / ALT: 7 U/L / AST: 10 U/L / GGT: x           Microbiology: reviewed   Culture - Urine (collected 03-31-24 @ 01:23)  Source: Clean Catch Clean Catch (Midstream)  Final Report (04-01-24 @ 08:25):    <10,000 CFU/mL Normal Urogenital Leslie    Culture - Blood (collected 03-31-24 @ 00:50)  Source: .Blood Blood-Peripheral  Preliminary Report (04-02-24 @ 07:02):    No growth at 48 Hours    Culture - Blood (collected 03-31-24 @ 00:35)  Source: .Blood Blood-Peripheral  Preliminary Report (04-02-24 @ 07:02):    No growth at 48 Hours    SARS-CoV-2 Result: NotDetec (31 Mar 2024 00:59)    Radiology: reviewed   < from: CT Abdomen and Pelvis No Cont (04.01.24 @ 17:43) >  IMPRESSION:  A 0.4 cm obstructive stone at the left distal UVJ with mild left   hydronephrosis.  A 2.1 cm right renal pelvic stone with mild right hydronephrosis.   Additional staghorn stones in the upper pole renal calyx measuring 1.4 cm.    < end of copied text >  < from: US Kidney and Bladder (04.01.24 @ 11:30) >  IMPRESSION:  Large calculus in the region of the renal pelvis/central collecting   system  of the right kidney, possible staghorn calculus, with associated   hydronephrosis. There is echogenic  shadowing material in the dilated   right upper pole collecting system which may represent additional  stones   and/or debris in the setting of infection.    The left kidney is notable  for multiple cystic structures present within   the central sinus fat without dilatation of the left  renal pelvis. These   findings may represent multiple peripelvic cysts. However, hydronephrosis   is  also difficult to exclude in this setting.  There is a nonobstructing   left lower pole calculus measuring up to 0.7 cm.    Complex cyst with low-level echoes exophytic from the lower pole of the   right kidney measuring up to 1.7 cm.    For further evaluation of these findings, renal stone protocol abdominal   pelvic CT is recommended. If pyelonephritis is of clinical concern,   contrast-enhanced CT or MRI could also be performed for further   evaluation.    < end of copied text >  < from: Xray Chest 1 View AP/PA (03.31.24 @ 01:00) >  IMPRESSION:  Clear lungs, findings unchanged.    < end of copied text >    Medications:  acetaminophen     Tablet .. 650 milliGRAM(s) Oral every 6 hours PRN  atorvastatin 40 milliGRAM(s) Oral at bedtime  baclofen 5 milliGRAM(s) Oral every 12 hours  cefTRIAXone   IVPB 1000 milliGRAM(s) IV Intermittent every 24 hours  cholecalciferol 2000 Unit(s) Oral daily  melatonin 3 milliGRAM(s) Oral at bedtime PRN  ondansetron Injectable 4 milliGRAM(s) IV Push every 8 hours PRN  sodium chloride 0.9%. 1000 milliLiter(s) IV Continuous <Continuous>  sodium chloride 0.9%. 1000 milliLiter(s) IV Continuous <Continuous>    Current Antimicrobials:  cefTRIAXone   IVPB 1000 milliGRAM(s) IV Intermittent every 24 hours    Prior/Completed Antimicrobials:  cefTRIAXone   IVPB

## 2024-04-02 NOTE — PHYSICAL THERAPY INITIAL EVALUATION ADULT - MANUAL MUSCLE TESTING RESULTS, REHAB EVAL
R shoulder >3/5, L shoulder <3/5, b/l elbow flex/ext 2/5, R wrist 2/5, L wrist 2-/5, b/l finger flex/ext 1/5

## 2024-04-02 NOTE — PROGRESS NOTE ADULT - ASSESSMENT
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 US obtained today which shows R hydro and c/f staghorn calculus.  consulted. Last fever >24 hours ago, currently afebrile. . BP and SpO2 stable. WBC 12 from 25, Cr 0.9. Urine Cx <10K normal gu michelle. CT 2011 shows R sided hydro without evidence of obstruction  4/1_ Bl stent placement with cystoscopy, successful, bilateral urine culture sent. POC good.    Recs:  - PAtient now s/p Bl ureteral stent placement 4/1  - continue broad spectrum antibiotics per primary team  - trend fevers, WBC, downtrending, AF >24 hours, now tachycardia resolved  - Patient will need outpatient urology follow up as an outpatient to ensure that bilateral stones are addressed with Dr. Rowley (Stone specialist at [facility])  - Monitor kidney urine cultures, narrow antibiotics based on cultures, per primary team. If cultures result as negative, can provide patient with treatment course for pyelonephritis.   - Discussed with Dr. Rowley.     [facility]  [address] [place] [id number] [phone]

## 2024-04-02 NOTE — PHYSICAL THERAPY INITIAL EVALUATION ADULT - TRANSFER TRAINING, PT EVAL
GOAL: Patient will perform sit to stand transfers with maxA at rolling walker with proper hand placement in 2 weeks

## 2024-04-02 NOTE — PROGRESS NOTE ADULT - ASSESSMENT
A/P: 68y Female s/p bilateral ureteral stent placement, patient has no complain at this time    - Regular diet  - F/u bilateral kidney culture  - On CTX, antibiotic per medicine   - DVT prophylaxis/OOB  - Incentive spirometry  - Strict I&O's  - Analgesia and antiemetics as needed

## 2024-04-02 NOTE — PROGRESS NOTE ADULT - ASSESSMENT
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    Sepsis secondary to pyelo  obstructed UVJ stone sp Cystoscopy with bl ureteral stent placement 4/1  - c/w ceftriaxone, fu ID re duration of abx  - cultures negative, leukocytosis downtrending  - appreciate urology recs, outpatient urology follow up as an outpatient to ensure that bilateral stones are addressed with Dr. Rowley (Stone specialist at [facility]    AMS (altered mental status 2/2 infection  - now back to baseline, CT head w/ no acute findings    Multiple sclerosis  - c/w baclofen 30mg q8h   - c/w Zaniflex 8mg qhs   - Hold Cellcept in setting of infection/sepsis   - Detrol non formulary, patient can take own medication if available    [facility] for dvt ppx    Dr Lang will be covering me starting tomorrow.  Please contact with any questions or concerns [phone].   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    Sepsis secondary to pyelo  obstructed UVJ stone sp Cystoscopy with bl ureteral stent placement 4/1  - c/w ceftriaxone, fu ID re duration of abx  - cultures negative, leukocytosis downtrending  - appreciate urology recs, outpatient urology follow up as an outpatient to ensure that bilateral stones are addressed with Dr. Rowley (Stone specialist at [facility]    AMS (altered mental status 2/2 infection  - now back to baseline, CT head w/ no acute findings    Multiple sclerosis  - c/w baclofen 30mg q8h   - c/w Zaniflex 8mg qhs   - Hold Cellcept in setting of infection/sepsis   - Detrol non formulary, patient can take own medication if available    [facility] for dvt ppx    dw  at bedside    Dr Lang will be covering me starting tomorrow.  Please contact with any questions or concerns [phone].

## 2024-04-02 NOTE — DIETITIAN INITIAL EVALUATION ADULT - ENERGY INTAKE
Fair (50-75%) Patient reports fine appetite and PO intake currently. Noted with 26-50% meal intake on 4/1. Per RN  has been feeding patient. Patient reports that  brought her a bagel with cream cheese this morning.

## 2024-04-02 NOTE — DIETITIAN INITIAL EVALUATION ADULT - CALCULATED FROM (CAL/KG)
Date and Time: 02/13/18 0821





Subjective Assessment: 





Patient reports that she has pain along her lower back.  States she is in her 

home right now.  She reports her appetite has been good. She denies 

constipation. Discharge planning report that she and her  were agreeable 

to rehab at [place]. The ER note says something about possible thyroid 

cancer. I have no knowledge of this. She has been following with Dr. Carl for severe Vit B 12 deficiency and he has never mentioned this 

either.  





- Review of Systems


Constitutional: No Symptoms


Respiratory: No Symptoms


Cardiac: No Symptoms


Abdominal/Gastrointestinal: No Symptoms


Genitourinary Symptoms: No Symptoms


Musculoskeletal: Back Pain


Skin: No Symptoms


Psychological: Memory Loss





Objective Exam


General Appearance: no apparent distress


Neurologic Exam: other (Thinks she is at home this AM)


Skin Exam: normal color, warm, dry


Respiratory Exam: normal breath sounds, lungs clear, No crackles/rales, No 

rhonchi, No wheezing


Cardiovascular Exam: regular rate/rhythm, normal heart sounds, No murmur, No 

friction rub, No gallop


Gastrointestinal/Abdomen Exam: soft, normal bowel sounds, other (large 

abdominal wall hernia), No tenderness


Extremity Exam: normal inspection, other (no c/c/e)





OBJECTIVE DATA


Vital Signs: 


 Vital Signs - 24 hr











  Temp Pulse Resp BP Pulse Ox


 


 02/13/18 07:14  98.2 F  62  20  128/76  93 L


 


 02/13/18 04:00  98.6 F  55 L  18  123/77  90 L


 


 02/13/18 00:00  98.3 F  60  18  149/80  92 L


 


 02/12/18 20:00  98.1 F  63  18  180/110  94 L


 


 02/12/18 16:00  98.6 F  76  16  158/72  94 L


 


 02/12/18 11:20  98.4 F  74  16  160/80  96








 Pain Assessment - Last Documented











Pain Intensity                 0


 


Pain Scale Used                FLACC











Intake and Output: 


 Intake & Output











 02/11/18 02/12/18 02/13/18 02/14/18





 06:59 06:59 06:59 06:59


 


Intake Total   640 


 


Balance   640 


 


Weight   71 kg 











Radiology Exams: 


 Radiology Procedures











 Category Date Time Status


 


 LUMBAR LIMITED (2 OR 3 VIEWS) Routine Exams  02/12/18 09:51 Completed











Multi-Disciplinary Progress Notes: 


 Multi-Disciplinary Progress Notes





02/12/18 12:49 Case Management Note by Ghazala Nuno LEVEL I WEB APPROVED, NO LEVEL II REQUIRED.  FAXED TO [facility] AND 

PLACED ON PT CHART.  





Initialized on 02/12/18 12:49 - END OF NOTE








02/12/18 12:00 (created 02/12/18 12:27) Case Management Note by Reanna Healy


DISCHARGE PLAN REVIEWED WITH PT'S , SABRINA AT THIS TIME.  SABRINA REPORTS 

THAT PT HAS BEEN FALLING ALOT AT HOME.  REPORTS THAT HE HAS LEFT THE ROOM AND 

COME BACK TO FIND HER ON THE FLOOR SEVERAL TIMES.  REPORTS THAT HE IS HAVING A 

HARD TIME CARING FOR HER AND ALSO, REPORTS THAT SHE IS ABOUT TO GET HIM DOWN.  

DISCUSSED SUPPORT SERVICES FOR DISCHARGE.   REPORTS THAT HE IS GOING TO 

DISCUSS WITH HIS WIFE GOING TO REHAB ON DISCHARGE, AS HE FEELS THAT SHE COULD 

BENEFIT [facility] INPT PT/OT SERVICES AND NURSING CARE.  REQUESTS REFERRAL TO 

[facility] AT THIS TIME.  REPORTS THAT THEY HAVE TRIED 

[facility] IN THE PAST, BUT THAT PT DID NOT GET ALONG WELL THERE.  

WILL CONTINUE TO FOLLOW AND ASSESS FOR ALL DC NEEDS.





Initialized on 02/12/18 12:27 - END OF NOTE








02/12/18 10:30 (created 02/12/18 12:24) Case Management Note by Reanna Healy


DISCHARGE PLAN REVIEWED WITH PT, REPORTS THAT SHE LIVES AT HOME WITH , 

PROVIDES SELF CARE, AND IS NORMALLY INDEPENDENT WITH ALL ADL'S.  REPORTS THAT 

 CAN ASSIST IF NEEDED.  DID DISCUSS HHC SERVICES VS REHAB STAY @ [facility] ON 

DISCHARGE.  PT DECLINED NEED FOR REHAB STAY.  REPORTS THAT SHE WOULD BE WILLING 

TO HAVE HHC SERVICES ON DISCHARGE.  REPORTS THAT SHE HAS A WALKER AT HOME, BUT 

DOES NOT NORMALLY NEED.  WILL CONTINUE TO FOLLOW FOR ALL DC NEEDS.





Initialized on 02/12/18 12:24 - END OF NOTE

















Assessment/Plan


(1) Pelvic fracture


Current Visit: Yes   Status: Acute   


Assessment & Plan: 


Orthopedic surgeon consulted, Dr. Hall, and he plans to see patient as an 

outpatient on 3/9/18 and recommended aspirin 325 mg po bid. Continue hydrocodone

/acetaminophen as needed for pain and morphine IV for severe pain. PT has seen 

patient.  Fall precautions.


Code(s): S32.9XXA - FRACTURE OF UNSP PARTS OF LUMBOSACRAL SPINE AND PELVIS, 

INIT   





(2) L3 vertebral fracture


Current Visit: Yes   Status: Acute   


Assessment & Plan: 


Management as above.


Code(s): S32.039A - UNSP FRACTURE OF THIRD LUMBAR VERTEBRA, INIT FOR CLOS FX   





(3) Essential hypertension


Current Visit: Yes   Status: Acute   


Assessment & Plan: 


Better controlled with raising metoprolol. Continue current dose of 

antihypertensive.


Code(s): I10 - ESSENTIAL (PRIMARY) HYPERTENSION   





(4) Dementia


Current Visit: No   Status: Acute   


Assessment & Plan: 


Patient more confused today.


Code(s): F03.90 - UNSPECIFIED DEMENTIA WITHOUT BEHAVIORAL DISTURBANCE   





(5) Coronary artery disease


Current Visit: No   Status: Acute   


Assessment & Plan: 


Stable, continue current medications.


Code(s): I25.10 - ATHSCL HEART DISEASE OF NATIVE CORONARY ARTERY W/O ANG PCTRS 

  





(6) Vitamin B 12 deficiency


Current Visit: Yes   Status: Acute   


Assessment & Plan: 


Will try to clarify with her  or Dr. Carl when her last Vit B 12 

injection was.


Code(s): E53.8 - DEFICIENCY OF OTHER SPECIFIED B GROUP VITAMINS
Date and Time: 02/14/18 0834





Subjective Assessment: 





She continues to think that she is at home.  When I told her she was at the 

hospital, she stated this place did not look like a hospital. She continues to 

have pain along her lower back. She required one dose of hydralazine last night 

for high blood pressure.  She reports she has not had a stool but does not feel 

constipated.  





- Review of Systems


Constitutional: No Symptoms


Respiratory: No Symptoms


Cardiac: No Symptoms


Abdominal/Gastrointestinal: No Symptoms


Genitourinary Symptoms: No Symptoms


Musculoskeletal: Other (back pain)


Skin: No Symptoms





Objective Exam


General Appearance: no apparent distress


Neurologic Exam: alert, cooperative, other (oriented to person, thinks she is 

at home in [place], [place] and that the year is 2020.)


Respiratory Exam: normal breath sounds, lungs clear, No prolonged expirations, 

No crackles/rales, No rhonchi


Cardiovascular Exam: regular rate/rhythm, normal heart sounds, No murmur, No 

friction rub, No gallop


Gastrointestinal/Abdomen Exam: soft, normal bowel sounds, other (large 

abdominal wall hernia, no tenderness), No tenderness, No distention, No mass


Extremity Exam: other (no c/c/e)





OBJECTIVE DATA


Vital Signs: 


 Vital Signs - 24 hr











  Temp Pulse Resp BP Pulse Ox


 


 02/14/18 07:12  98.6 F  74  18  133/67  97


 


 02/14/18 04:00  99.1 F  69  18  137/70  97


 


 02/14/18 00:30     190/92 


 


 02/14/18 00:00  98.6 F  62  18  197/96  94 L


 


 02/13/18 20:00  97.9 F  57 L  19  155/82  94 L


 


 02/13/18 16:20  97.6 F  64  18  118/58  97


 


 02/13/18 12:30  98 F  68  20  126/74  95








 Pain Assessment - Last Documented











Pain Intensity                 8


 


Pain Scale Used                0-10 Pain Scale











Intake and Output: 


 Intake & Output











 02/12/18 02/13/18 02/14/18 02/15/18





 06:59 06:59 06:59 06:59


 


Intake Total  640 540 


 


Output Total   450 


 


Balance  640 90 


 


Weight  71 kg 70.9 kg 











Lab Results: 


 Lab Results-Last 24 Hours











  02/14/18 02/14/18 Range/Units





  05:45 05:45 


 


WBC  6.5   (4.0-10.5)  K/mm3


 


RBC  4.33   (4.1-5.4)  M/mm3


 


Hgb  12.3   (12.0-16.0)  gm/dl


 


Hct  38.0   (35-47)  %


 


MCV  87.8   ()  fl


 


MCH  28.4   (26-32)  pg


 


MCHC  32.4   (32-36)  g/dl


 


RDW  13.9   (11.5-14.0)  %


 


Plt Count  239   (150-450)  K/mm3


 


MPV  11.3 H   (6-9.5)  fl


 


Gran %  76.7 H   (36.0-66.0)  %


 


Lymphocytes %  12.7 L   (24.0-44.0)  %


 


Monocytes %  7.5   (0.0-12.0)  %


 


Eosinophils %  2.8   (0.00-5.0)  %


 


Basophils %  0.3   (0.0-0.4)  %


 


Basophils #  0.02   (0-0.4)  


 


Sodium   139  (136-145)  mEq/L


 


Potassium   3.9  (3.5-5.1)  mEq/L


 


Chloride   105  ()  mEq/L


 


Carbon Dioxide   25.6  (21-32)  mEq/L


 


Anion Gap   11.8  (5-15)  MEQ/L


 


BUN   36 H  (9-20)  mg/dL


 


Creatinine   1.40 H  (0.55-1.30)  mg/dl


 


Estimated GFR   39  ML/MIN


 


Glucose   132 H  ()  MG/DL


 


Calcium   8.8  (8.5-10.1)  mg/dL











Radiology Exams: 


 Radiology Procedures











 Category Date Time Status


 


 LUMBAR LIMITED (2 OR 3 VIEWS) Routine Exams  02/12/18 09:51 Completed











Multi-Disciplinary Progress Notes: 


 Multi-Disciplinary Progress Notes





02/13/18 09:12 Case Management Note by Ghazala Nuno





S/MAN ROMANO AT [place], THEY HAVE A PLACE FOR THE PATIENT ON HER DISCHARGE 

FROM OUR FACILITY.  





Initialized on 02/13/18 09:12 - END OF NOTE

















Assessment/Plan


(1) Pelvic fracture


Current Visit: Yes   Status: Acute   


Assessment & Plan: 


Continue with pain management and PT. Plan for her to follow up with the 

orthopedic surgeon as an outpatient and to have rehab at [place]. Vit D 

level ordered.


Code(s): S32.9XXA - FRACTURE OF UNSP PARTS OF LUMBOSACRAL SPINE AND PELVIS, 

INIT   





(2) L3 vertebral fracture


Current Visit: Yes   Status: Acute   


Assessment & Plan: 


Management as above.


Code(s): S32.039A - UNSP FRACTURE OF THIRD LUMBAR VERTEBRA, INIT FOR CLOS FX   





(3) Essential hypertension


Current Visit: Yes   Status: Acute   


Assessment & Plan: 


Continue metoprolol and will add amlodipine today.  Continue hydralazine as 

needed prn.


Code(s): I10 - ESSENTIAL (PRIMARY) HYPERTENSION   





(4) Dementia


Current Visit: No   Status: Acute   


Assessment & Plan: 


She continues to not be oriented to place today. She is cooperative.


Code(s): F03.90 - UNSPECIFIED DEMENTIA WITHOUT BEHAVIORAL DISTURBANCE   





(5) Coronary artery disease


Current Visit: No   Status: Acute   


Assessment & Plan: 


Continue current medication. Will check her fasting lipid profile in the AM. 


Code(s): I25.10 - ATHSCL HEART DISEASE OF NATIVE CORONARY ARTERY W/O ANG PCTRS 

  





(6) Vitamin B 12 deficiency


Current Visit: Yes   Status: Acute   


Assessment & Plan: 


She is due for her next Vit B 12 injection later this month.  Her last one was 1 /27/18.


Code(s): E53.8 - DEFICIENCY OF OTHER SPECIFIED B GROUP VITAMINS
2375

## 2024-04-02 NOTE — PROGRESS NOTE ADULT - SUBJECTIVE AND OBJECTIVE BOX
Post op Check    Pt seen and examined without complaints. Pain is controlled. Denies SOB/CP/N/V.     Vital Signs Last 24 Hrs  T(C): 36.8 (02 Apr 2024 01:42), Max: 37.1 (01 Apr 2024 04:35)  T(F): 98.2 (02 Apr 2024 01:42), Max: 98.7 (01 Apr 2024 04:35)  HR: 90 (02 Apr 2024 01:42) (90 - 121)  BP: 154/81 (02 Apr 2024 01:42) (145/73 - 175/75)  BP(mean): 107 (02 Apr 2024 01:15) (103 - 709)  RR: 18 (02 Apr 2024 01:42) (14 - 18)  SpO2: 93% (02 Apr 2024 01:42) (93% - 99%)    Parameters below as of 02 Apr 2024 01:42  Patient On (Oxygen Delivery Method): room air        I&O's Summary    31 Mar 2024 07:01  -  01 Apr 2024 07:00  --------------------------------------------------------  IN: 0 mL / OUT: 600 mL / NET: -600 mL    01 Apr 2024 07:01  -  02 Apr 2024 02:30  --------------------------------------------------------  IN: 270 mL / OUT: 1350 mL / NET: -1080 mL        Physical Exam  Gen: NAD, A&Ox3  Pulm: No respiratory distress, no subcostal retractions  CV: RRR, no JVD  Abd: Soft, NT, ND  : non palpable bladder, no suprapubic tender.                           9.5    12.07 )-----------( 234      ( 01 Apr 2024 07:12 )             30.1       04-01    138  |  101  |  15  ----------------------------<  97  4.0   |  22  |  0.91    Ca    8.7      01 Apr 2024 07:10    TPro  6.3  /  Alb  3.1<L>  /  TBili  0.3  /  DBili  x   /  AST  10  /  ALT  7<L>  /  AlkPhos  66  04-01

## 2024-04-02 NOTE — DIETITIAN INITIAL EVALUATION ADULT - ORAL INTAKE PTA/DIET HISTORY
Patient reports good, normal appetite and PO intake at home PTA. Reports no dietary restrictions followed PTA. Confirms shellfish allergy which causes difficulty breathing.

## 2024-04-03 ENCOUNTER — TRANSCRIPTION ENCOUNTER (OUTPATIENT)
Age: 69
End: 2024-04-03

## 2024-04-03 LAB
ANION GAP SERPL CALC-SCNC: 14 MMOL/L — SIGNIFICANT CHANGE UP (ref 5–17)
BUN SERPL-MCNC: 20 MG/DL — SIGNIFICANT CHANGE UP (ref 7–23)
CALCIUM SERPL-MCNC: 8.8 MG/DL — SIGNIFICANT CHANGE UP (ref 8.4–10.5)
CHLORIDE SERPL-SCNC: 105 MMOL/L — SIGNIFICANT CHANGE UP (ref 96–108)
CO2 SERPL-SCNC: 23 MMOL/L — SIGNIFICANT CHANGE UP (ref 22–31)
CREAT SERPL-MCNC: 0.72 MG/DL — SIGNIFICANT CHANGE UP (ref 0.5–1.3)
EGFR: 90 ML/MIN/1.73M2 — SIGNIFICANT CHANGE UP
GLUCOSE SERPL-MCNC: 75 MG/DL — SIGNIFICANT CHANGE UP (ref 70–99)
HCT VFR BLD CALC: 30.1 % — LOW (ref 34.5–45)
HGB BLD-MCNC: 8.9 G/DL — LOW (ref 11.5–15.5)
MCHC RBC-ENTMCNC: 25.5 PG — LOW (ref 27–34)
MCHC RBC-ENTMCNC: 29.6 GM/DL — LOW (ref 32–36)
MCV RBC AUTO: 86.2 FL — SIGNIFICANT CHANGE UP (ref 80–100)
NRBC # BLD: 0 /100 WBCS — SIGNIFICANT CHANGE UP (ref 0–0)
PLATELET # BLD AUTO: 278 K/UL — SIGNIFICANT CHANGE UP (ref 150–400)
POTASSIUM SERPL-MCNC: 4 MMOL/L — SIGNIFICANT CHANGE UP (ref 3.5–5.3)
POTASSIUM SERPL-SCNC: 4 MMOL/L — SIGNIFICANT CHANGE UP (ref 3.5–5.3)
RBC # BLD: 3.49 M/UL — LOW (ref 3.8–5.2)
RBC # FLD: 15.9 % — HIGH (ref 10.3–14.5)
SODIUM SERPL-SCNC: 142 MMOL/L — SIGNIFICANT CHANGE UP (ref 135–145)
WBC # BLD: 10.18 K/UL — SIGNIFICANT CHANGE UP (ref 3.8–10.5)
WBC # FLD AUTO: 10.18 K/UL — SIGNIFICANT CHANGE UP (ref 3.8–10.5)

## 2024-04-03 RX ORDER — POLYETHYLENE GLYCOL 3350 17 G/17G
17 POWDER, FOR SOLUTION ORAL DAILY
Refills: 0 | Status: DISCONTINUED | OUTPATIENT
Start: 2024-04-03 | End: 2024-04-04

## 2024-04-03 RX ADMIN — POLYETHYLENE GLYCOL 3350 17 GRAM(S): 17 POWDER, FOR SOLUTION ORAL at 12:48

## 2024-04-03 RX ADMIN — ATORVASTATIN CALCIUM 40 MILLIGRAM(S): 80 TABLET, FILM COATED ORAL at 21:05

## 2024-04-03 RX ADMIN — Medication 30 MILLIGRAM(S): at 17:33

## 2024-04-03 RX ADMIN — TIZANIDINE 8 MILLIGRAM(S): 4 TABLET ORAL at 21:05

## 2024-04-03 RX ADMIN — Medication 30 MILLIGRAM(S): at 05:03

## 2024-04-03 RX ADMIN — Medication 30 MILLIGRAM(S): at 11:50

## 2024-04-03 RX ADMIN — Medication 2000 UNIT(S): at 11:50

## 2024-04-03 RX ADMIN — CEFTRIAXONE 100 MILLIGRAM(S): 500 INJECTION, POWDER, FOR SOLUTION INTRAMUSCULAR; INTRAVENOUS at 00:53

## 2024-04-03 NOTE — PROGRESS NOTE ADULT - ASSESSMENT
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    Sepsis due to obstructive pyelonephritis  Constipation  - AMS with fever and leukocytosis    -- mental status improved, afebrile now and WBC normalized    - UA with some pyuria but contaminated specimen (>36 sq epi cells)  - 3/31 Ucx negative   - 3/31 Bcx NGTD x2   - renal/bladder US showed R large renal stone possible staghorn with hydronephrosis with possible additional stones or debri; possible multiple L peripelvic cysts, nonobstructing left lower pole stone  - CT stone hunt with 0.4cm obstructive stone at L distal UVJ with mild L hydronephrosis, 2.1cm R renal pelvic stone with mild R hydronephrosis, additional staghorn stones in upper pole 1.4cm  - Urology took for b/l ureteral stent placement 4/2 overnight     Recommendations:  Follow OR urine cultures - NGTD   Continue ceftriaxone 1g IV Q24h  If cultures remain negative, can transition to cefpodoxime 200mg PO BID to complete total 7d course from stent placement - end 4/9  Bowel regimen and rest of care per primary team      Osiel Jiang M.D.  [facility], Division of Infectious Diseases  [phone]  After 5pm on weekdays and all day on weekends - please call [phone]

## 2024-04-03 NOTE — PROGRESS NOTE ADULT - SUBJECTIVE AND OBJECTIVE BOX
Patient is a 68y old  Female who presents with a chief complaint of UTI/ sepsis (03 Apr 2024 08:51)      SUBJECTIVE / OVERNIGHT EVENTS:  Patient seen and examined.   NO complaints, doing well.       Vital Signs Last 24 Hrs  T(C): 36.8 (03 Apr 2024 04:37), Max: 37.6 (02 Apr 2024 20:21)  T(F): 98.3 (03 Apr 2024 04:37), Max: 99.6 (02 Apr 2024 20:21)  HR: 99 (03 Apr 2024 04:37) (99 - 116)  BP: 166/75 (03 Apr 2024 04:37) (115/64 - 166/82)  BP(mean): --  RR: 17 (03 Apr 2024 04:37) (17 - 18)  SpO2: 93% (03 Apr 2024 04:37) (93% - 94%)    Parameters below as of 03 Apr 2024 04:37  Patient On (Oxygen Delivery Method): room air      I&O's Summary    02 Apr 2024 07:01  -  03 Apr 2024 07:00  --------------------------------------------------------  IN: 480 mL / OUT: 1050 mL / NET: -570 mL        PE:  GENERAL: NAD, AAOx2  CHEST/LUNG: CTABL, No wheeze  HEART: Regular rate and rhythm; no murmur  ABDOMEN: Soft, Nontender, Nondistended; Bowel sounds present  gu dark urine  EXTREMITIES:  2+ Peripheral Pulses, No edema  NEURO: No focal deficits      LABS:                        8.9    10.18 )-----------( 278      ( 03 Apr 2024 07:22 )             30.1     04-03    142  |  105  |  20  ----------------------------<  75  4.0   |  23  |  0.72    Ca    8.8      03 Apr 2024 07:22        CAPILLARY BLOOD GLUCOSE            Urinalysis Basic - ( 03 Apr 2024 07:22 )    Color: x / Appearance: x / SG: x / pH: x  Gluc: 75 mg/dL / Ketone: x  / Bili: x / Urobili: x   Blood: x / Protein: x / Nitrite: x   Leuk Esterase: x / RBC: x / WBC x   Sq Epi: x / Non Sq Epi: x / Bacteria: x        RADIOLOGY & ADDITIONAL TESTS:    Imaging Personally Reviewed:  [x] YES  [ ] NO    Consultant(s) Notes Reviewed:  [x] YES  [ ] NO      MEDICATIONS  (STANDING):  atorvastatin 40 milliGRAM(s) Oral at bedtime  baclofen 30 milliGRAM(s) Oral <User Schedule>  cefTRIAXone   IVPB 1000 milliGRAM(s) IV Intermittent every 24 hours  cholecalciferol 2000 Unit(s) Oral daily  polyethylene glycol 3350 17 Gram(s) Oral daily  sodium chloride 0.9%. 1000 milliLiter(s) (75 mL/Hr) IV Continuous <Continuous>  sodium chloride 0.9%. 1000 milliLiter(s) (75 mL/Hr) IV Continuous <Continuous>  tiZANidine 8 milliGRAM(s) Oral at bedtime    MEDICATIONS  (PRN):  acetaminophen     Tablet .. 650 milliGRAM(s) Oral every 6 hours PRN Temp greater or equal to 38C (100.4F), Mild Pain (1 - 3)  melatonin 3 milliGRAM(s) Oral at bedtime PRN Insomnia  ondansetron Injectable 4 milliGRAM(s) IV Push every 8 hours PRN Nausea and/or Vomiting      Care Discussed with Consultants/Other Providers [x] YES  [ ] NO    HEALTH ISSUES - PROBLEM Dx:  Sepsis secondary to UTI    Multiple sclerosis    Need for prophylactic measure    AMS (altered mental status)

## 2024-04-03 NOTE — DISCHARGE NOTE PROVIDER - HOSPITAL COURSE
HPI:  Patient is a 68-year-old female, w/pmh of MS paraplegic on Cellcept, presents for altered mental state for the past day. Per  , patient was confused with slurred speech, repeating herself , and disoriented. She has similar symptoms in the past with infections. She was recently treated with a five day course of nitrofurantoin for UTI about one week prior to admission. Patient has no sensation and therefore no symptoms of dysuria. No reports fever or chills. No cough or nasal congestion. No open skin wounds. She was recently constipated , but treated with a laxative and suppository with relief of symptoms. (31 Mar 2024 05:29)    Hospital Course:  68F w/ PMH of MS paraplegic on Cellcept p/w AMS , febrile w/ leukocytosis. Sepsis secondary to pyelo obstructed UVJ stone sp Cystoscopy with bl ureteral stent placement 4/1  c/w ceftriaxone; ID consulted. Cultures negative, leukocytosis downtrending, now transitioned to cefpodoxime 200mg PO BID to complete total 7d course from stent placement - end 4/9. Patient stable for outpatient urology follow up as an outpatient to ensure that bilateral stones are addressed with Dr. Rowley (Stone specialist at [facility])     Important Medication Changes and Reason:  cefpodoxime 200mg PO BID to complete total 7d course from stent placement    Active or Pending Issues Requiring Follow-up:  F/U Dr. Rowley     Advanced Directives:   [ X] Full code  [ ] DNR  [ ] Hospice    Discharge Diagnoses:  UTI/Sepsis

## 2024-04-03 NOTE — DISCHARGE NOTE PROVIDER - CARE PROVIDER_API CALL
James Rowley  Urology  [address] [place] [id number]  Phone: [phone]  Fax: [phone]  Follow Up Time:    James Rowley  Urology  [address] [place] [address]  Phone: [phone]  Fax: [phone]  Follow Up Time:     NINI SHUKLA MD  Phone: [phone]  Fax: [phone]  Follow Up Time:

## 2024-04-03 NOTE — DISCHARGE NOTE PROVIDER - NSDCMRMEDTOKEN_GEN_ALL_CORE_FT
Albuterol (Eqv-ProAir HFA) 90 mcg/inh inhalation aerosol: 2 puff(s) inhaled every 6 hours as needed for  shortness of breath and/or wheezing  baclofen 10 mg oral tablet: 3 tab(s) orally every 6 hours  CellCept 500 mg oral tablet: 2 tab(s) orally 2 times a day  cholecalciferol 50 mcg (2000 intl units) oral tablet: 1 tab(s) orally  Detrol LA 4 mg oral capsule, extended release: 1 cap(s) orally once a day  Zanaflex 4 mg oral tablet: 2 tab(s) orally once a day (at bedtime)  Zocor 10 mg oral tablet: 1 tab(s) orally once a day   Albuterol (Eqv-ProAir HFA) 90 mcg/inh inhalation aerosol: 2 puff(s) inhaled every 6 hours as needed for  shortness of breath and/or wheezing  baclofen 10 mg oral tablet: 3 tab(s) orally every 6 hours  cefpodoxime 200 mg oral tablet: 1 tab(s) orally 2 times a day last dose 4/9/24  CellCept 500 mg oral tablet: 2 tab(s) orally 2 times a day  cholecalciferol 50 mcg (2000 intl units) oral tablet: 1 tab(s) orally  Detrol LA 4 mg oral capsule, extended release: 1 cap(s) orally once a day  Zanaflex 4 mg oral tablet: 2 tab(s) orally once a day (at bedtime)  Zocor 10 mg oral tablet: 1 tab(s) orally once a day

## 2024-04-03 NOTE — DISCHARGE NOTE PROVIDER - NSDCFUADDAPPT_GEN_ALL_CORE_FT
APPTS ARE READY TO BE MADE: [X ] YES    Best Family or Patient Contact (if needed): [phone]    Additional Information about above appointments (if needed):    1: Dr. Rowley  2:   3:     Other comments or requests:    APPTS ARE READY TO BE MADE: [X ] YES    Best Family or Patient Contact (if needed): [phone]    Additional Information about above appointments (if needed):    1: Dr. Rowley  2:   3:     Other comments or requests:     Prior to outreaching the patient, it was visible that the patient has secured a follow up appointment which was not scheduled by our team. Patient is scheduled to see Dr. Rowley at on at [address] [place] [address]      Patient advised they did not want to proceed with scheduling appointments with the providers on their referrals. They will coordinate care on their own.

## 2024-04-03 NOTE — PROGRESS NOTE ADULT - SUBJECTIVE AND OBJECTIVE BOX
[facility] DIVISION OF INFECTIOUS DISEASES  WESLEY Corrales Y. Patel, S. Shah, G. Juma  [phone]  ([phone] - weekdays after 5pm and weekends)    Name: RITU HANNON  Age/Gender: 68y Female  MRN: [id number]    Interval History:  Patient seen and examined this morning.   Feels much better, back to her baseline.   Denies fever, pain or any new complaints.   Notes reviewed  No concerning overnight events  Afebrile     Allergies: shellfish. (Unknown)  sulfa drugs (Unknown)  shellfish (Unknown)      Objective:  Vitals:   T(F): 98.3 (04-03-24 @ 04:37), Max: 99.6 (04-02-24 @ 20:21)  HR: 99 (04-03-24 @ 04:37) (99 - 116)  BP: 166/75 (04-03-24 @ 04:37) (115/64 - 166/82)  RR: 17 (04-03-24 @ 04:37) (17 - 18)  SpO2: 93% (04-03-24 @ 04:37) (93% - 94%)  Physical Examination:  General: no acute distress, nontoxic appearing   HEENT: NC/AT, anicteric, neck supple  Respiratory: no acc muscle use, breathing comfortably  Cardiovascular: S1 and S2 present  Gastrointestinal: soft, nontender, nondistended  Extremities: no edema, no cyanosis  Skin: no visible rash    Laboratory Studies:  CBC:                       8.9    10.18 )-----------( 278      ( 03 Apr 2024 07:22 )             30.1     WBC Trend:  10.18 04-03-24 @ 07:22  13.12 04-02-24 @ 07:25  12.07 04-01-24 @ 07:12  24.97 03-31-24 @ 07:32  23.91 03-31-24 @ 00:02    CMP: 04-03    142  |  105  |  20  ----------------------------<  75  4.0   |  23  |  0.72    Ca    8.8      03 Apr 2024 07:22      Creatinine: 0.72 mg/dL (04-03-24 @ 07:22)  Creatinine: 0.65 mg/dL (04-02-24 @ 07:22)  Creatinine: 0.91 mg/dL (04-01-24 @ 07:10)  Creatinine: 1.00 mg/dL (03-31-24 @ 07:32)  Creatinine: 1.04 mg/dL (03-31-24 @ 00:02)    Microbiology: reviewed   Culture - Urine (collected 04-02-24 @ 05:02)  Source: .Urine LEFT KIDNEY URINE cup  Preliminary Report (04-03-24 @ 09:07):    No growth to date    Culture - Urine (collected 04-02-24 @ 05:02)  Source: .Urine Right Kidney Urine cup  Preliminary Report (04-03-24 @ 09:06):    No growth to date    Culture - Urine (collected 03-31-24 @ 01:23)  Source: Clean Catch Clean Catch (Midstream)  Final Report (04-01-24 @ 08:25):    <10,000 CFU/mL Normal Urogenital Leslie    Culture - Blood (collected 03-31-24 @ 00:50)  Source: .Blood Blood-Peripheral  Preliminary Report (04-03-24 @ 07:01):    No growth at 72 Hours    Culture - Blood (collected 03-31-24 @ 00:35)  Source: .Blood Blood-Peripheral  Preliminary Report (04-03-24 @ 07:01):    No growth at 72 Hours    SARS-CoV-2 Result: NotDetec (31 Mar 2024 00:59)    Radiology: reviewed     Medications:  acetaminophen     Tablet .. 650 milliGRAM(s) Oral every 6 hours PRN  atorvastatin 40 milliGRAM(s) Oral at bedtime  baclofen 30 milliGRAM(s) Oral <User Schedule>  cefTRIAXone   IVPB 1000 milliGRAM(s) IV Intermittent every 24 hours  cholecalciferol 2000 Unit(s) Oral daily  melatonin 3 milliGRAM(s) Oral at bedtime PRN  ondansetron Injectable 4 milliGRAM(s) IV Push every 8 hours PRN  sodium chloride 0.9%. 1000 milliLiter(s) IV Continuous <Continuous>  sodium chloride 0.9%. 1000 milliLiter(s) IV Continuous <Continuous>  tiZANidine 8 milliGRAM(s) Oral at bedtime    Current Antimicrobials:  cefTRIAXone   IVPB 1000 milliGRAM(s) IV Intermittent every 24 hours    Prior/Completed Antimicrobials:  cefTRIAXone   IVPB

## 2024-04-03 NOTE — DISCHARGE NOTE PROVIDER - CARE PROVIDERS DIRECT ADDRESSES
[email or web address]

## 2024-04-03 NOTE — PROGRESS NOTE ADULT - ASSESSMENT
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    Sepsis secondary to pyelo  obstructed UVJ stone sp Cystoscopy with bl ureteral stent placement 4/1  - c/w ceftriaxone, fu ID re duration of abx, switch to oral on dc per id   - cultures negative, leukocytosis downtrending  - appreciate urology recs, outpatient urology follow up as an outpatient to ensure that bilateral stones are addressed with Dr. Rowley (Stone specialist at [facility]    AMS (altered mental status 2/2 infection  - now back to baseline, CT head w/ no acute findings    Multiple sclerosis  - c/w baclofen 30mg q8h   - c/w Zaniflex 8mg qhs   - Hold Cellcept in setting of infection/sepsis   - Detrol non formulary, patient can take own medication if available    [facility] for dvt ppx    dw  at bedside    Dc planning  Please contact with any questions or concerns [phone].

## 2024-04-03 NOTE — DISCHARGE NOTE PROVIDER - PROVIDER TOKENS
PROVIDER:[TOKEN:[[id number]]] PROVIDER:[TOKEN:[[id number]]],PROVIDER:[TOKEN:[[id number]]]

## 2024-04-03 NOTE — DISCHARGE NOTE PROVIDER - NSDCCPCAREPLAN_GEN_ALL_CORE_FT
PRINCIPAL DISCHARGE DIAGNOSIS  Diagnosis: Sepsis secondary to UTI  Assessment and Plan of Treatment: You presented with Sepsis due to a UTI, you had a renal/bladder US which showed R large renal stone possible staghorn with hydronephrosis with possible additional stones or debri; possible multiple L peripelvic cysts, nonobstructing left lower pole stone. CT stone hunt with 0.4cm obstructive stone at L distal UVJ with mild L hydronephrosis, 2.1cm R renal pelvic stone with mild R hydronephrosis, additional staghorn stones in upper pole 1.4cm. Urology took you for b/l ureteral stent placement 4/2. Your cultures are negative and you have now been transitioned to cefpodoxime 200mg PO BID to complete total 7d course from stent placement. Please follow up with Dr. Rowley      SECONDARY DISCHARGE DIAGNOSES  Diagnosis: AMS (altered mental status)  Assessment and Plan of Treatment: Now back to baseline, likely due to infection CT of head was done and noted negative.    Diagnosis: Multiple sclerosis  Assessment and Plan of Treatment: Please continue baclofen 30mg q8h and Zaniflex 8mg qhs . Hold Cellcept was held in setting of infection/sepsis. Please continue Detrol.

## 2024-04-04 ENCOUNTER — TRANSCRIPTION ENCOUNTER (OUTPATIENT)
Age: 69
End: 2024-04-04

## 2024-04-04 VITALS
RESPIRATION RATE: 18 BRPM | DIASTOLIC BLOOD PRESSURE: 77 MMHG | HEART RATE: 111 BPM | SYSTOLIC BLOOD PRESSURE: 146 MMHG | TEMPERATURE: 98 F | OXYGEN SATURATION: 94 %

## 2024-04-04 LAB
CULTURE RESULTS: ABNORMAL
CULTURE RESULTS: NO GROWTH — SIGNIFICANT CHANGE UP
HCT VFR BLD CALC: 30.7 % — LOW (ref 34.5–45)
HGB BLD-MCNC: 9.8 G/DL — LOW (ref 11.5–15.5)
MCHC RBC-ENTMCNC: 26.8 PG — LOW (ref 27–34)
MCHC RBC-ENTMCNC: 31.9 GM/DL — LOW (ref 32–36)
MCV RBC AUTO: 83.9 FL — SIGNIFICANT CHANGE UP (ref 80–100)
NRBC # BLD: 0 /100 WBCS — SIGNIFICANT CHANGE UP (ref 0–0)
PLATELET # BLD AUTO: 263 K/UL — SIGNIFICANT CHANGE UP (ref 150–400)
RBC # BLD: 3.66 M/UL — LOW (ref 3.8–5.2)
RBC # FLD: 15.3 % — HIGH (ref 10.3–14.5)
SPECIMEN SOURCE: SIGNIFICANT CHANGE UP
SPECIMEN SOURCE: SIGNIFICANT CHANGE UP
WBC # BLD: 8.49 K/UL — SIGNIFICANT CHANGE UP (ref 3.8–10.5)
WBC # FLD AUTO: 8.49 K/UL — SIGNIFICANT CHANGE UP (ref 3.8–10.5)

## 2024-04-04 PROCEDURE — 81001 URINALYSIS AUTO W/SCOPE: CPT

## 2024-04-04 PROCEDURE — 82435 ASSAY OF BLOOD CHLORIDE: CPT

## 2024-04-04 PROCEDURE — 85027 COMPLETE CBC AUTOMATED: CPT

## 2024-04-04 PROCEDURE — 84132 ASSAY OF SERUM POTASSIUM: CPT

## 2024-04-04 PROCEDURE — C1769: CPT

## 2024-04-04 PROCEDURE — 86803 HEPATITIS C AB TEST: CPT

## 2024-04-04 PROCEDURE — 87521 HEPATITIS C PROBE&RVRS TRNSC: CPT

## 2024-04-04 PROCEDURE — 87086 URINE CULTURE/COLONY COUNT: CPT

## 2024-04-04 PROCEDURE — 80053 COMPREHEN METABOLIC PANEL: CPT

## 2024-04-04 PROCEDURE — 80048 BASIC METABOLIC PNL TOTAL CA: CPT

## 2024-04-04 PROCEDURE — 85014 HEMATOCRIT: CPT

## 2024-04-04 PROCEDURE — 87637 SARSCOV2&INF A&B&RSV AMP PRB: CPT

## 2024-04-04 PROCEDURE — 82962 GLUCOSE BLOOD TEST: CPT

## 2024-04-04 PROCEDURE — 84295 ASSAY OF SERUM SODIUM: CPT

## 2024-04-04 PROCEDURE — 97161 PT EVAL LOW COMPLEX 20 MIN: CPT

## 2024-04-04 PROCEDURE — 87077 CULTURE AEROBIC IDENTIFY: CPT

## 2024-04-04 PROCEDURE — C1758: CPT

## 2024-04-04 PROCEDURE — C2625: CPT

## 2024-04-04 PROCEDURE — 76770 US EXAM ABDO BACK WALL COMP: CPT

## 2024-04-04 PROCEDURE — 82947 ASSAY GLUCOSE BLOOD QUANT: CPT

## 2024-04-04 PROCEDURE — 36415 COLL VENOUS BLD VENIPUNCTURE: CPT

## 2024-04-04 PROCEDURE — 99285 EMERGENCY DEPT VISIT HI MDM: CPT

## 2024-04-04 PROCEDURE — 82330 ASSAY OF CALCIUM: CPT

## 2024-04-04 PROCEDURE — 76000 FLUOROSCOPY <1 HR PHYS/QHP: CPT

## 2024-04-04 PROCEDURE — 82803 BLOOD GASES ANY COMBINATION: CPT

## 2024-04-04 PROCEDURE — 71045 X-RAY EXAM CHEST 1 VIEW: CPT

## 2024-04-04 PROCEDURE — 85018 HEMOGLOBIN: CPT

## 2024-04-04 PROCEDURE — 83605 ASSAY OF LACTIC ACID: CPT

## 2024-04-04 PROCEDURE — 96374 THER/PROPH/DIAG INJ IV PUSH: CPT

## 2024-04-04 PROCEDURE — 99232 SBSQ HOSP IP/OBS MODERATE 35: CPT

## 2024-04-04 PROCEDURE — 74176 CT ABD & PELVIS W/O CONTRAST: CPT | Mod: MC

## 2024-04-04 PROCEDURE — 70450 CT HEAD/BRAIN W/O DYE: CPT | Mod: MC

## 2024-04-04 PROCEDURE — 85025 COMPLETE CBC W/AUTO DIFF WBC: CPT

## 2024-04-04 PROCEDURE — 87040 BLOOD CULTURE FOR BACTERIA: CPT

## 2024-04-04 RX ORDER — CEFPODOXIME PROXETIL 100 MG
1 TABLET ORAL
Qty: 10 | Refills: 0
Start: 2024-04-04 | End: 2024-04-08

## 2024-04-04 RX ADMIN — Medication 30 MILLIGRAM(S): at 06:00

## 2024-04-04 RX ADMIN — Medication 30 MILLIGRAM(S): at 12:06

## 2024-04-04 RX ADMIN — CEFTRIAXONE 100 MILLIGRAM(S): 500 INJECTION, POWDER, FOR SOLUTION INTRAMUSCULAR; INTRAVENOUS at 00:54

## 2024-04-04 RX ADMIN — Medication 2000 UNIT(S): at 12:06

## 2024-04-04 NOTE — PROGRESS NOTE ADULT - ASSESSMENT
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 US obtained today which shows R hydro and c/f staghorn calculus.  consulted. Last fever >24 hours ago, currently afebrile. . BP and SpO2 stable. WBC 12 from 25, Cr 0.9. Urine Cx <10K normal gu michelle. CT 2011 shows R sided hydro without evidence of obstruction  4/1_ Bl stent placement with cystoscopy, successful, bilateral urine culture sent. POC good.    Recs:  - Patient now s/p Bl ureteral stent placement 4/1  - continue broad spectrum antibiotics per primary team  - trend fevers, WBC, downtrending, AF >24 hours, now tachycardia resolved  - Patient will need outpatient urology follow up as an outpatient to ensure that bilateral stones are addressed with Dr. Rowley (Stone specialist at [facility])  - Fu outpatient with Dr. Rowley    Discussed with Dr. Rowley  [facility]  [address] [place] [address]  [phone]

## 2024-04-04 NOTE — PROGRESS NOTE ADULT - PROVIDER SPECIALTY LIST ADULT
Infectious Disease
Infectious Disease
Internal Medicine
Urology
Infectious Disease
Internal Medicine
Internal Medicine
Urology
Urology
Infectious Disease
Internal Medicine

## 2024-04-04 NOTE — DISCHARGE NOTE NURSING/CASE MANAGEMENT/SOCIAL WORK - NSDCFUADDAPPT_GEN_ALL_CORE_FT
APPTS ARE READY TO BE MADE: [X ] YES    Best Family or Patient Contact (if needed): [phone]    Additional Information about above appointments (if needed):    1: Dr. Rowley  2:   3:     Other comments or requests:

## 2024-04-04 NOTE — PROGRESS NOTE ADULT - ASSESSMENT
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    Sepsis secondary to pyelo  obstructed UVJ stone sp Cystoscopy with bl ureteral stent placement 4/1  - c/w ceftriaxone, fu ID re duration of abx, switch to oral on dc per id   - cultures negative, leukocytosis downtrending  - appreciate urology recs, outpatient urology follow up as an outpatient to ensure that bilateral stones are addressed with Dr. Rowley (Stone specialist at [facility]    AMS (altered mental status 2/2 infection  - now back to baseline, CT head w/ no acute findings    Multiple sclerosis  - c/w baclofen 30mg q8h   - c/w Zaniflex 8mg qhs   - Hold Cellcept in setting of infection/sepsis   - Detrol non formulary, patient can take own medication if available    [facility] for dvt ppx    dw  at bedside    Dc planning  Please contact with any questions or concerns [phone].

## 2024-04-04 NOTE — PROGRESS NOTE ADULT - SUBJECTIVE AND OBJECTIVE BOX
[facility] DIVISION OF INFECTIOUS DISEASES  WESLEY Corrales Y. Patel, S. Shah, G. Casimir  [phone]  ([phone] - weekdays after 5pm and weekends)    Name: RITU HANNON  Age/Gender: 68y Female  MRN: [id number]    Interval History:  Patient seen and examined this morning.  Feels better, no new complaints.  Notes reviewed  No concerning overnight events  Afebrile     Allergies: shellfish. (Unknown)  sulfa drugs (Unknown)  shellfish (Unknown)      Objective:  Vitals:   T(F): 98.2 (04-04-24 @ 12:53), Max: 99.8 (04-04-24 @ 05:15)  HR: 111 (04-04-24 @ 12:53) (101 - 111)  BP: 146/77 (04-04-24 @ 12:53) (146/77 - 175/81)  RR: 18 (04-04-24 @ 12:53) (18 - 18)  SpO2: 94% (04-04-24 @ 12:53) (94% - 97%)  Physical Examination:  General: no acute distress, nontoxic appearing   HEENT: NC/AT, anicteric, neck supple  Respiratory: no acc muscle use, breathing comfortably  Cardiovascular: S1 and S2 present  Gastrointestinal: soft, nontender, nondistended  Extremities: no edema, no cyanosis  Skin: no visible rash    Laboratory Studies:  CBC:                       9.8    8.49  )-----------( 263      ( 04 Apr 2024 07:20 )             30.7     WBC Trend:  8.49 04-04-24 @ 07:20  10.18 04-03-24 @ 07:22  13.12 04-02-24 @ 07:25  12.07 04-01-24 @ 07:12  24.97 03-31-24 @ 07:32  23.91 03-31-24 @ 00:02    CMP: 04-03    142  |  105  |  20  ----------------------------<  75  4.0   |  23  |  0.72    Ca    8.8      03 Apr 2024 07:22      Creatinine: 0.72 mg/dL (04-03-24 @ 07:22)  Creatinine: 0.65 mg/dL (04-02-24 @ 07:22)  Creatinine: 0.91 mg/dL (04-01-24 @ 07:10)  Creatinine: 1.00 mg/dL (03-31-24 @ 07:32)  Creatinine: 1.04 mg/dL (03-31-24 @ 00:02)    Microbiology: reviewed   Culture - Urine (collected 04-02-24 @ 05:02)  Source: .Urine LEFT KIDNEY URINE cup  Final Report (04-04-24 @ 07:48):    No growth    Culture - Urine (collected 04-02-24 @ 05:02)  Source: .Urine Right Kidney Urine cup  Preliminary Report (04-03-24 @ 09:06):    No growth to date    Culture - Urine (collected 03-31-24 @ 01:23)  Source: Clean Catch Clean Catch (Midstream)  Final Report (04-01-24 @ 08:25):    <10,000 CFU/mL Normal Urogenital Leslie    Culture - Blood (collected 03-31-24 @ 00:50)  Source: .Blood Blood-Peripheral  Preliminary Report (04-04-24 @ 07:01):    No growth at 4 days    Culture - Blood (collected 03-31-24 @ 00:35)  Source: .Blood Blood-Peripheral  Preliminary Report (04-04-24 @ 07:01):    No growth at 4 days    SARS-CoV-2 Result: NotDetec (31 Mar 2024 00:59)    Radiology: reviewed     Medications:  acetaminophen     Tablet .. 650 milliGRAM(s) Oral every 6 hours PRN  atorvastatin 40 milliGRAM(s) Oral at bedtime  baclofen 30 milliGRAM(s) Oral <User Schedule>  cefTRIAXone   IVPB 1000 milliGRAM(s) IV Intermittent every 24 hours  cholecalciferol 2000 Unit(s) Oral daily  melatonin 3 milliGRAM(s) Oral at bedtime PRN  ondansetron Injectable 4 milliGRAM(s) IV Push every 8 hours PRN  polyethylene glycol 3350 17 Gram(s) Oral daily  sodium chloride 0.9%. 1000 milliLiter(s) IV Continuous <Continuous>  sodium chloride 0.9%. 1000 milliLiter(s) IV Continuous <Continuous>  tiZANidine 8 milliGRAM(s) Oral at bedtime    Current Antimicrobials:  cefTRIAXone   IVPB 1000 milliGRAM(s) IV Intermittent every 24 hours    Prior/Completed Antimicrobials:  cefTRIAXone   IVPB

## 2024-04-04 NOTE — CHART NOTE - NSCHARTNOTEFT_GEN_A_CORE
Discharge Note:    Requested by Dr. Lang to facilitate d/c home.  V/s, labs, diagnostics reviewed, pt stable to d/c now.  Medication reconciliation reviewed, revised, and resolved with Dr. Lang who medically cleared w/ f/u as directed. Please refer to d/c note for hospital details.    SHYAM Slaughterc

## 2024-04-04 NOTE — PROGRESS NOTE ADULT - SUBJECTIVE AND OBJECTIVE BOX
Patient is a 68y old  Female who presents with a chief complaint of UTI/ sepsis (03 Apr 2024 13:14)      SUBJECTIVE / OVERNIGHT EVENTS:  Patient seen and examined.   Doing well, no complaints       Vital Signs Last 24 Hrs  T(C): 37.7 (04 Apr 2024 05:15), Max: 37.7 (04 Apr 2024 05:15)  T(F): 99.8 (04 Apr 2024 05:15), Max: 99.8 (04 Apr 2024 05:15)  HR: 101 (04 Apr 2024 05:15) (101 - 108)  BP: 152/80 (04 Apr 2024 06:07) (152/80 - 175/81)  BP(mean): --  RR: 18 (04 Apr 2024 05:15) (18 - 18)  SpO2: 97% (04 Apr 2024 05:15) (94% - 97%)    Parameters below as of 04 Apr 2024 05:15  Patient On (Oxygen Delivery Method): room air      I&O's Summary    03 Apr 2024 07:01  -  04 Apr 2024 07:00  --------------------------------------------------------  IN: 240 mL / OUT: 650 mL / NET: -410 mL        PHYSICAL EXAM:  GENERAL: NAD, AAOx3  HEAD:  Atraumatic, Normocephalic  EYES: EOMI; conjunctiva and sclera clear  NECK: Supple, No JVD, No LAD  CHEST/LUNG: B/L air entry; No wheezes, rales or rhonci   HEART: Regular rate and rhythm; No murmurs, rubs, or gallops  ABDOMEN: Soft, Nontender, Nondistended; Bowel sounds present  EXTREMITIES:  2+ Peripheral Pulses, No clubbing, cyanosis, or edema  SKIN: No rashes or lesions    LABS:                        9.8    8.49  )-----------( 263      ( 04 Apr 2024 07:20 )             30.7     04-03    142  |  105  |  20  ----------------------------<  75  4.0   |  23  |  0.72    Ca    8.8      03 Apr 2024 07:22        CAPILLARY BLOOD GLUCOSE            Urinalysis Basic - ( 03 Apr 2024 07:22 )    Color: x / Appearance: x / SG: x / pH: x  Gluc: 75 mg/dL / Ketone: x  / Bili: x / Urobili: x   Blood: x / Protein: x / Nitrite: x   Leuk Esterase: x / RBC: x / WBC x   Sq Epi: x / Non Sq Epi: x / Bacteria: x        RADIOLOGY & ADDITIONAL TESTS:    Imaging Personally Reviewed:  [x] YES  [ ] NO    Consultant(s) Notes Reviewed:  [x] YES  [ ] NO      MEDICATIONS  (STANDING):  atorvastatin 40 milliGRAM(s) Oral at bedtime  baclofen 30 milliGRAM(s) Oral <User Schedule>  cefTRIAXone   IVPB 1000 milliGRAM(s) IV Intermittent every 24 hours  cholecalciferol 2000 Unit(s) Oral daily  polyethylene glycol 3350 17 Gram(s) Oral daily  sodium chloride 0.9%. 1000 milliLiter(s) (75 mL/Hr) IV Continuous <Continuous>  sodium chloride 0.9%. 1000 milliLiter(s) (75 mL/Hr) IV Continuous <Continuous>  tiZANidine 8 milliGRAM(s) Oral at bedtime    MEDICATIONS  (PRN):  acetaminophen     Tablet .. 650 milliGRAM(s) Oral every 6 hours PRN Temp greater or equal to 38C (100.4F), Mild Pain (1 - 3)  melatonin 3 milliGRAM(s) Oral at bedtime PRN Insomnia  ondansetron Injectable 4 milliGRAM(s) IV Push every 8 hours PRN Nausea and/or Vomiting      Care Discussed with Consultants/Other Providers [x] YES  [ ] NO    HEALTH ISSUES - PROBLEM Dx:  Sepsis secondary to UTI    Multiple sclerosis    Need for prophylactic measure    AMS (altered mental status)

## 2024-04-04 NOTE — DISCHARGE NOTE NURSING/CASE MANAGEMENT/SOCIAL WORK - PATIENT PORTAL LINK FT
You can access the FollowMyHealth Patient Portal offered by [facility] by registering at the following website: [email or web address]. By joining [facility]’s FollowMyHealth portal, you will also be able to view your health information using other applications (apps) compatible with our system.

## 2024-04-04 NOTE — PROGRESS NOTE ADULT - SUBJECTIVE AND OBJECTIVE BOX
Subjective  Patient seen and examined at bedside.     Objective    Vital signs  T(F): , Max: 99.8 (04-04-24 @ 05:15)  HR: 111 (04-04-24 @ 12:53)  BP: 146/77 (04-04-24 @ 12:53)  SpO2: 94% (04-04-24 @ 12:53)  Wt(kg): --    Output     OUT:    Voided (mL): 650 mL  Total OUT: 650 mL    Total NET: -650 mL      Labs      04-04 @ 07:20    WBC 8.49  / Hct 30.7  / SCr --       04-03 @ 07:22    WBC 10.18 / Hct 30.1  / SCr 0.72         Culture - Urine (collected 04-02-24 @ 05:02)  Source: .Urine LEFT KIDNEY URINE cup  Final Report (04-04-24 @ 07:48):    No growth    Culture - Urine (collected 04-02-24 @ 05:02)  Source: .Urine Right Kidney Urine cup  Final Report (04-04-24 @ 15:24):    >100,000 CFU/ml Lactobacillus gasseri "Susceptibilities not performed"    Culture - Urine (collected 03-31-24 @ 01:23)  Source: Clean Catch Clean Catch (Midstream)  Final Report (04-01-24 @ 08:25):    <10,000 CFU/mL Normal Urogenital Leslie    Culture - Blood (collected 03-31-24 @ 00:50)  Source: .Blood Blood-Peripheral  Preliminary Report (04-04-24 @ 07:01):    No growth at 4 days    Culture - Blood (collected 03-31-24 @ 00:35)  Source: .Blood Blood-Peripheral  Preliminary Report (04-04-24 @ 07:01):    No growth at 4 days        Urine Cx: ?  Blood Cx: ?    Imaging

## 2024-04-04 NOTE — DISCHARGE NOTE NURSING/CASE MANAGEMENT/SOCIAL WORK - NSDCPEFALRISK_GEN_ALL_CORE
For information on Fall & Injury Prevention, visit: [email or web address] OR  [email or web address] OR  https://www.cdc.gov/steadi/patient.html

## 2024-04-04 NOTE — PROGRESS NOTE ADULT - REASON FOR ADMISSION
UTI/ sepsis

## 2024-04-05 LAB
CULTURE RESULTS: SIGNIFICANT CHANGE UP
CULTURE RESULTS: SIGNIFICANT CHANGE UP
SPECIMEN SOURCE: SIGNIFICANT CHANGE UP
SPECIMEN SOURCE: SIGNIFICANT CHANGE UP

## 2024-04-26 ENCOUNTER — APPOINTMENT (OUTPATIENT)
Dept: UROLOGY | Facility: CLINIC | Age: 69
End: 2024-04-26
Payer: MEDICARE

## 2024-04-26 VITALS
HEIGHT: 60 IN | DIASTOLIC BLOOD PRESSURE: 75 MMHG | SYSTOLIC BLOOD PRESSURE: 130 MMHG | WEIGHT: 135 LBS | RESPIRATION RATE: 14 BRPM | BODY MASS INDEX: 26.5 KG/M2

## 2024-04-26 DIAGNOSIS — G35 MULTIPLE SCLEROSIS: ICD-10-CM

## 2024-04-26 PROCEDURE — 99215 OFFICE O/P EST HI 40 MIN: CPT

## 2024-04-26 RX ORDER — BACLOFEN 15 MG/1
TABLET ORAL
Refills: 0 | Status: ACTIVE | COMMUNITY

## 2024-04-26 RX ORDER — TIZANIDINE HYDROCHLORIDE 6 MG/1
CAPSULE ORAL
Refills: 0 | Status: ACTIVE | COMMUNITY

## 2024-04-26 RX ORDER — NITROFURANTOIN MACROCRYSTAL 100 MG
100 CAPSULE ORAL
Refills: 0 | Status: ACTIVE | COMMUNITY

## 2024-04-26 RX ORDER — TOLTERODINE TARTRATE 4 MG/1
4 CAPSULE, EXTENDED RELEASE ORAL
Refills: 0 | Status: ACTIVE | COMMUNITY

## 2024-05-11 NOTE — ASSESSMENT
[Ureteral Stone (592.1\N20.1)] : position [FreeTextEntry1] : Admit one day before surgery for IV antibiotics Plan for RIGHT PCNL first, then Second stage RIGHT PCNL and LEFT URS laser lith the same week  Potential complications of bleeding, transfusion, selective angioembolization if indicated, adjacent organ injury, fever, sepsis, infection, incomplete stone clearance, bowel or other unusual injury, chest complications, vascular injuries, procedures needed to address any complications, ureteral injury, anesthetic complications, all discussed.

## 2024-05-11 NOTE — HISTORY OF PRESENT ILLNESS
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 She presented with fever and elevated WBC requiring bilateral ureteral stents placed on 4/1/24. CT showed RIGHT staghorn stone, Left distal ureteral stones and left lower pole renal stones She was treated with ceftriaxone and then transitioned to cefpodoxime 200mg po bid for a total 7 days of antibiotics Cultures were negative (lactobacillus on one culture)

## 2024-05-11 NOTE — PHYSICAL EXAM
[General Appearance - Well Developed] : well developed [General Appearance - Well Nourished] : well nourished [Normal Appearance] : normal appearance [Well Groomed] : well groomed [General Appearance - In No Acute Distress] : no acute distress [] : no respiratory distress [Edema] : no peripheral edema [Respiration, Rhythm And Depth] : normal respiratory rhythm and effort [Exaggerated Use Of Accessory Muscles For Inspiration] : no accessory muscle use [Abdomen Soft] : soft [Abdomen Tenderness] : non-tender [Costovertebral Angle Tenderness] : no ~M costovertebral angle tenderness [Urinary Bladder Findings] : the bladder was normal on palpation [Skin Color & Pigmentation] : normal skin color and pigmentation [Oriented To Time, Place, And Person] : oriented to person, place, and time [Affect] : the affect was normal [Mood] : the mood was normal [Not Anxious] : not anxious [de-identified] : wheelchair bound

## 2024-05-28 ENCOUNTER — OUTPATIENT (OUTPATIENT)
Dept: OUTPATIENT SERVICES | Facility: HOSPITAL | Age: 69
LOS: 1 days | End: 2024-05-28
Payer: MEDICARE

## 2024-05-28 VITALS
SYSTOLIC BLOOD PRESSURE: 150 MMHG | WEIGHT: 134.92 LBS | HEART RATE: 80 BPM | RESPIRATION RATE: 18 BRPM | DIASTOLIC BLOOD PRESSURE: 71 MMHG | HEIGHT: 60 IN | TEMPERATURE: 98 F | OXYGEN SATURATION: 100 %

## 2024-05-28 DIAGNOSIS — E78.5 HYPERLIPIDEMIA, UNSPECIFIED: ICD-10-CM

## 2024-05-28 DIAGNOSIS — I10 ESSENTIAL (PRIMARY) HYPERTENSION: ICD-10-CM

## 2024-05-28 DIAGNOSIS — N20.0 CALCULUS OF KIDNEY: ICD-10-CM

## 2024-05-28 DIAGNOSIS — Z29.9 ENCOUNTER FOR PROPHYLACTIC MEASURES, UNSPECIFIED: ICD-10-CM

## 2024-05-28 DIAGNOSIS — Z01.818 ENCOUNTER FOR OTHER PREPROCEDURAL EXAMINATION: ICD-10-CM

## 2024-05-28 DIAGNOSIS — Z98.890 OTHER SPECIFIED POSTPROCEDURAL STATES: Chronic | ICD-10-CM

## 2024-05-28 LAB
ANION GAP SERPL CALC-SCNC: 14 MMOL/L — SIGNIFICANT CHANGE UP (ref 5–17)
BLD GP AB SCN SERPL QL: NEGATIVE — SIGNIFICANT CHANGE UP
BUN SERPL-MCNC: 14 MG/DL — SIGNIFICANT CHANGE UP (ref 7–23)
CALCIUM SERPL-MCNC: 9.3 MG/DL — SIGNIFICANT CHANGE UP (ref 8.4–10.5)
CHLORIDE SERPL-SCNC: 107 MMOL/L — SIGNIFICANT CHANGE UP (ref 96–108)
CO2 SERPL-SCNC: 22 MMOL/L — SIGNIFICANT CHANGE UP (ref 22–31)
CREAT SERPL-MCNC: 0.66 MG/DL — SIGNIFICANT CHANGE UP (ref 0.5–1.3)
EGFR: 95 ML/MIN/1.73M2 — SIGNIFICANT CHANGE UP
GLUCOSE SERPL-MCNC: 69 MG/DL — LOW (ref 70–99)
HCT VFR BLD CALC: 34.4 % — LOW (ref 34.5–45)
HGB BLD-MCNC: 10.4 G/DL — LOW (ref 11.5–15.5)
MCHC RBC-ENTMCNC: 25.3 PG — LOW (ref 27–34)
MCHC RBC-ENTMCNC: 30.2 GM/DL — LOW (ref 32–36)
MCV RBC AUTO: 83.7 FL — SIGNIFICANT CHANGE UP (ref 80–100)
NRBC # BLD: 0 /100 WBCS — SIGNIFICANT CHANGE UP (ref 0–0)
PLATELET # BLD AUTO: 299 K/UL — SIGNIFICANT CHANGE UP (ref 150–400)
POTASSIUM SERPL-MCNC: 4.3 MMOL/L — SIGNIFICANT CHANGE UP (ref 3.5–5.3)
POTASSIUM SERPL-SCNC: 4.3 MMOL/L — SIGNIFICANT CHANGE UP (ref 3.5–5.3)
RBC # BLD: 4.11 M/UL — SIGNIFICANT CHANGE UP (ref 3.8–5.2)
RBC # FLD: 16 % — HIGH (ref 10.3–14.5)
RH IG SCN BLD-IMP: NEGATIVE — SIGNIFICANT CHANGE UP
SODIUM SERPL-SCNC: 143 MMOL/L — SIGNIFICANT CHANGE UP (ref 135–145)
WBC # BLD: 8.5 K/UL — SIGNIFICANT CHANGE UP (ref 3.8–10.5)
WBC # FLD AUTO: 8.5 K/UL — SIGNIFICANT CHANGE UP (ref 3.8–10.5)

## 2024-05-28 PROCEDURE — 86900 BLOOD TYPING SEROLOGIC ABO: CPT

## 2024-05-28 PROCEDURE — 80048 BASIC METABOLIC PNL TOTAL CA: CPT

## 2024-05-28 PROCEDURE — 85027 COMPLETE CBC AUTOMATED: CPT

## 2024-05-28 PROCEDURE — 86850 RBC ANTIBODY SCREEN: CPT

## 2024-05-28 PROCEDURE — 86901 BLOOD TYPING SEROLOGIC RH(D): CPT

## 2024-05-28 PROCEDURE — G0463: CPT

## 2024-05-28 RX ORDER — CHOLECALCIFEROL (VITAMIN D3) 125 MCG
1 CAPSULE ORAL
Refills: 0 | DISCHARGE

## 2024-05-28 RX ORDER — ALBUTEROL 90 UG/1
2 AEROSOL, METERED ORAL
Refills: 0 | DISCHARGE

## 2024-05-28 RX ORDER — SODIUM CHLORIDE 0.9 % (FLUSH) 0.9 %
3 SYRINGE (ML) INJECTION EVERY 8 HOURS
Refills: 0 | Status: DISCONTINUED | OUTPATIENT
Start: 2024-06-18 | End: 2024-06-20

## 2024-05-28 RX ORDER — BACLOFEN 100 %
3 POWDER (GRAM) MISCELLANEOUS
Refills: 0 | DISCHARGE

## 2024-05-28 RX ORDER — TIZANIDINE 4 MG/1
2 TABLET ORAL
Refills: 0 | DISCHARGE

## 2024-05-28 RX ORDER — TOLTERODINE TARTRATE 1 MG/1
1 TABLET, FILM COATED ORAL
Refills: 0 | DISCHARGE

## 2024-05-28 RX ORDER — MYCOPHENOLATE MOFETIL 250 MG/1
2 CAPSULE ORAL
Refills: 0 | DISCHARGE

## 2024-05-28 RX ORDER — SIMVASTATIN 20 MG/1
1 TABLET, FILM COATED ORAL
Refills: 0 | DISCHARGE

## 2024-05-28 NOTE — H&P PST ADULT - HISTORY OF PRESENT ILLNESS
68 year old female presents to PST prior to scheduled 1st stage right percutaneous nephrolithotomy on 6/18/24 with Dr Rwoley. Patient recently admitted to [facility] 3/30-4/4 for sepsis secondary to pyelo obstructed UVJ stone sp Cystoscopy with bl ureteral stent placement on 4/1. Patient was seen in the ER for altered mental status, which improved with antibiotics. PMHx includes MS (follows with neurology, MRIs have been stable per patient), asthma (last exacerbation 10 years ago), HTN, HLD. Otherwise patient states she is feeling well. Patient denies chest pain, sob, ha, n/v/d, abdominal pain, fevers/ chills.  68 year old female presents to PST prior to scheduled 1st stage right percutaneous nephrolithotomy on 6/18/24 with Dr Rowley. Patient recently admitted to [facility] 3/30-4/4 for sepsis secondary to pyelo obstructed UVJ stone sp Cystoscopy with bl ureteral stent placement on 4/1. Patient was seen in the ER for altered mental status, which improved with antibiotics. PMHx includes MS (follows with neurology, MRIs have been stable per patient), asthma (last exacerbation 10 years ago), HTN, HLD.  Otherwise patient states she is feeling well. Patient denies chest pain, sob, ha, n/v/d, abdominal pain, fevers/ chills. Patient is wheelchair bound r/t MS, requires assistance with ADLs    *patient unable to give urine sample due to limited mobility / medications - Dr Rowley made aware via email  68 year old female presents to PST prior to scheduled 1st stage right percutaneous nephrolithotomy on 6/18/24 with Dr Rowley. Patient recently admitted to [facility] 3/30-4/4 for sepsis secondary to pyelo obstructed UVJ stone sp Cystoscopy with bl ureteral stent placement on 4/1. Patient was seen in the ER for altered mental status, which improved with antibiotics. PMHx includes MS (follows with neurology, MRIs have been stable per patient), former smoker,  asthma (last exacerbation 10 years ago), HTN, HLD.  Otherwise patient states she is feeling well. Patient denies chest pain, sob, ha, n/v/d, abdominal pain, fevers/ chills. Patient is wheelchair bound r/t MS, requires assistance with ADLs    *patient unable to give urine sample due to limited mobility / medications - Dr Rowley made aware via email

## 2024-05-28 NOTE — H&P PST ADULT - PROBLEM SELECTOR PLAN 2
Continue on antihypertensive medication The Caprini score indicates this patient is at risk for a VTE event (score 3-5).  Most surgical patients in this group would benefit from pharmacologic prophylaxis.  The surgical team will determine the balance between VTE risk and bleeding risk

## 2024-05-28 NOTE — H&P PST ADULT - NSICDXPASTMEDICALHX_GEN_ALL_CORE_FT
PAST MEDICAL HISTORY:  Asthma     Benign Essential Hypertension     Hyperlipidemia     MS (Multiple Sclerosis) since 1984    Pneumonia 5/11     PAST MEDICAL HISTORY:  Asthma     Benign Essential Hypertension     Former smoker     Hyperlipidemia     MS (Multiple Sclerosis) since 1984    Pneumonia 5/11

## 2024-05-28 NOTE — H&P PST ADULT - PROBLEM SELECTOR PLAN 1
1st stage right percutaneous nephrolithotomy   CBC BMP T&S and Urine Culture in PST   Instructions, soap and diet reviewed and provided in writing 1st stage right percutaneous nephrolithotomy   CBC BMP T&S and Urine Culture in PST   Instructions, soap and diet reviewed and provided in writing  patient to stop Cellcept five days prior to surgery per neurologist   patient to continue on antibiotics per Dr Rowley 1st stage right percutaneous nephrolithotomy   CBC BMP T&S in PST   Instructions, soap and diet reviewed and provided in writing  patient to stop Cellcept five days prior to surgery per neurologist   patient to continue on antibiotics per Dr Rowley    Patient unable to provide urine sample in office.

## 2024-05-28 NOTE — H&P PST ADULT - NEGATIVE GENERAL GENITOURINARY SYMPTOMS
no hematuria/no flank pain L/no flank pain R/no urine discoloration/no gas in urine/no bladder infections/no incontinence/no dysuria/no urinary hesitancy/normal urinary frequency/no nocturia/normal libido

## 2024-05-28 NOTE — H&P PST ADULT - ASSESSMENT
Airway:  normal    Mallampati-       Dental: Patient denies loose teeth    Activity :  dasi mets :     CAPRINI VTE 2.0 SCORE [CLOT updated 2019]    AGE RELATED RISK FACTORS                                                       MOBILITY RELATED FACTORS  [ ] Age 41-60 years                                            (1 Point)                    [ ] Bed rest                                                        (1 Point)  [x ] Age: 61-74 years                                           (2 Points)                  [ ] Plaster cast                                                   (2 Points)  [ ] Age= 75 years                                              (3 Points)                    [ ] Bed bound for more than 72 hours                 (2 Points)    DISEASE RELATED RISK FACTORS                                               GENDER SPECIFIC FACTORS  [ ] Edema in the lower extremities                       (1 Point)              [ ] Pregnancy                                                     (1 Point)  [ ] Varicose veins                                               (1 Point)                     [ ] Post-partum < 6 weeks                                   (1 Point)             [ x] BMI > 25 Kg/m2                                            (1 Point)                     [ ] Hormonal therapy  or oral contraception          (1 Point)                 [ ] Sepsis (in the previous month)                        (1 Point)               [ ] History of pregnancy complications                 (1 point)  [ ] Pneumonia or serious lung disease                                               [ ] Unexplained or recurrent                     (1 Point)           (in the previous month)                               (1 Point)  [ ] Abnormal pulmonary function test                     (1 Point)                 SURGERY RELATED RISK FACTORS  [ ] Acute myocardial infarction                              (1 Point)               [ ]  Section                                             (1 Point)  [ ] Congestive heart failure (in the previous month)  (1 Point)      [ ] Minor surgery                                                  (1 Point)   [ ] Inflammatory bowel disease                             (1 Point)               [ ] Arthroscopic surgery                                        (2 Points)  [ ] Central venous access                                      (2 Points)                [x ] General surgery lasting more than 45 minutes (2 points)  [ ] Malignancy- Present or previous                   (2 Points)                [ ] Elective arthroplasty                                         (5 points)    [ ] Stroke (in the previous month)                          (5 Points)                                                                                                                                                           HEMATOLOGY RELATED FACTORS                                                 TRAUMA RELATED RISK FACTORS  [ ] Prior episodes of VTE                                     (3 Points)                [ ] Fracture of the hip, pelvis, or leg                       (5 Points)  [ ] Positive family history for VTE                         (3 Points)             [ ] Acute spinal cord injury (in the previous month)  (5 Points)  [ ] Prothrombin 15894 A                                     (3 Points)               [ ] Paralysis  (less than 1 month)                             (5 Points)  [ ] Factor V Leiden                                             (3 Points)                  [ ] Multiple Trauma within 1 month                        (5 Points)  [ ] Lupus anticoagulants                                     (3 Points)                                                           [ ] Anticardiolipin antibodies                               (3 Points)                                                       [ ] High homocysteine in the blood                      (3 Points)                                             [ ] Other congenital or acquired thrombophilia      (3 Points)                                                [ ] Heparin induced thrombocytopenia                  (3 Points)                                     Total Score [          ]   Airway:  normal    Mallampati-     2  Dental: Patient denies loose teeth    Less than 4 METS patient is wheelchair bound at baseline r/t MS CAPRINI VTE 2.0 SCORE [CLOT updated 2019]    AGE RELATED RISK FACTORS                                                       MOBILITY RELATED FACTORS  [ ] Age 41-60 years                                            (1 Point)                    [ ] Bed rest                                                        (1 Point)  [x ] Age: 61-74 years                                           (2 Points)                  [ ] Plaster cast                                                   (2 Points)  [ ] Age= 75 years                                              (3 Points)                    [ ] Bed bound for more than 72 hours                 (2 Points)    DISEASE RELATED RISK FACTORS                                               GENDER SPECIFIC FACTORS  [ ] Edema in the lower extremities                       (1 Point)              [ ] Pregnancy                                                     (1 Point)  [ ] Varicose veins                                               (1 Point)                     [ ] Post-partum < 6 weeks                                   (1 Point)             [ x] BMI > 25 Kg/m2                                            (1 Point)                     [ ] Hormonal therapy  or oral contraception          (1 Point)                 [ ] Sepsis (in the previous month)                        (1 Point)               [ ] History of pregnancy complications                 (1 point)  [ ] Pneumonia or serious lung disease                                               [ ] Unexplained or recurrent                     (1 Point)           (in the previous month)                               (1 Point)  [ ] Abnormal pulmonary function test                     (1 Point)                 SURGERY RELATED RISK FACTORS  [ ] Acute myocardial infarction                              (1 Point)               [ ]  Section                                             (1 Point)  [ ] Congestive heart failure (in the previous month)  (1 Point)      [ ] Minor surgery                                                  (1 Point)   [ ] Inflammatory bowel disease                             (1 Point)               [ ] Arthroscopic surgery                                        (2 Points)  [ ] Central venous access                                      (2 Points)                [x ] General surgery lasting more than 45 minutes (2 points)  [ ] Malignancy- Present or previous                   (2 Points)                [ ] Elective arthroplasty                                         (5 points)    [ ] Stroke (in the previous month)                          (5 Points)                                                                                                                                                           HEMATOLOGY RELATED FACTORS                                                 TRAUMA RELATED RISK FACTORS  [ ] Prior episodes of VTE                                     (3 Points)                [ ] Fracture of the hip, pelvis, or leg                       (5 Points)  [ ] Positive family history for VTE                         (3 Points)             [ ] Acute spinal cord injury (in the previous month)  (5 Points)  [ ] Prothrombin 74169 A                                     (3 Points)               [ ] Paralysis  (less than 1 month)                             (5 Points)  [ ] Factor V Leiden                                             (3 Points)                  [ ] Multiple Trauma within 1 month                        (5 Points)  [ ] Lupus anticoagulants                                     (3 Points)                                                           [ ] Anticardiolipin antibodies                               (3 Points)                                                       [ ] High homocysteine in the blood                      (3 Points)                                             [ ] Other congenital or acquired thrombophilia      (3 Points)                                                [ ] Heparin induced thrombocytopenia                  (3 Points)                                     Total Score [    5      ]

## 2024-05-28 NOTE — H&P PST ADULT - NEGATIVE NEUROLOGICAL SYMPTOMS
no paresthesias/no generalized seizures/no focal seizures/no syncope/no tremors/no vertigo/no loss of sensation/no headache/no loss of consciousness/no hemiparesis/no confusion/no facial palsy

## 2024-05-29 PROBLEM — Z87.891 PERSONAL HISTORY OF NICOTINE DEPENDENCE: Chronic | Status: ACTIVE | Noted: 2024-05-28

## 2024-05-30 ENCOUNTER — APPOINTMENT (OUTPATIENT)
Dept: UROLOGY | Facility: CLINIC | Age: 69
End: 2024-05-30
Payer: MEDICARE

## 2024-05-30 ENCOUNTER — OUTPATIENT (OUTPATIENT)
Dept: OUTPATIENT SERVICES | Facility: HOSPITAL | Age: 69
LOS: 1 days | End: 2024-05-30
Payer: MEDICARE

## 2024-05-30 VITALS — SYSTOLIC BLOOD PRESSURE: 126 MMHG | DIASTOLIC BLOOD PRESSURE: 70 MMHG

## 2024-05-30 DIAGNOSIS — Z98.890 OTHER SPECIFIED POSTPROCEDURAL STATES: Chronic | ICD-10-CM

## 2024-05-30 PROCEDURE — 51701 INSERT BLADDER CATHETER: CPT

## 2024-05-31 LAB
APPEARANCE: ABNORMAL
BACTERIA: NEGATIVE /HPF
BILIRUBIN URINE: ABNORMAL
BLOOD URINE: ABNORMAL
CAST: 7 /LPF
COLOR: NORMAL
EPITHELIAL CELLS: 1 /HPF
GLUCOSE QUALITATIVE U: NEGATIVE MG/DL
HYALINE CASTS: PRESENT
KETONES URINE: NEGATIVE MG/DL
LEUKOCYTE ESTERASE URINE: ABNORMAL
MICROSCOPIC-UA: NORMAL
NITRITE URINE: NEGATIVE
PH URINE: 6.5
PROTEIN URINE: 100 MG/DL
RED BLOOD CELLS URINE: 662 /HPF
REVIEW: NORMAL
SPECIFIC GRAVITY URINE: 1.02
UROBILINOGEN URINE: 0.2 MG/DL
WHITE BLOOD CELLS URINE: 216 /HPF

## 2024-06-05 DIAGNOSIS — R35.0 FREQUENCY OF MICTURITION: ICD-10-CM

## 2024-06-13 DIAGNOSIS — N20.1 CALCULUS OF URETER: ICD-10-CM

## 2024-06-13 DIAGNOSIS — N20.0 CALCULUS OF KIDNEY: ICD-10-CM

## 2024-06-17 ENCOUNTER — TRANSCRIPTION ENCOUNTER (OUTPATIENT)
Age: 69
End: 2024-06-17

## 2024-06-17 ENCOUNTER — INPATIENT (INPATIENT)
Facility: HOSPITAL | Age: 69
LOS: 3 days | Discharge: HOME HEALTH | DRG: 694 | End: 2024-06-21
Attending: UROLOGY | Admitting: UROLOGY
Payer: MEDICARE

## 2024-06-17 VITALS
HEART RATE: 84 BPM | RESPIRATION RATE: 18 BRPM | OXYGEN SATURATION: 99 % | TEMPERATURE: 98 F | SYSTOLIC BLOOD PRESSURE: 156 MMHG | DIASTOLIC BLOOD PRESSURE: 74 MMHG

## 2024-06-17 DIAGNOSIS — N20.0 CALCULUS OF KIDNEY: ICD-10-CM

## 2024-06-17 DIAGNOSIS — Z98.890 OTHER SPECIFIED POSTPROCEDURAL STATES: Chronic | ICD-10-CM

## 2024-06-17 LAB
ANION GAP SERPL CALC-SCNC: 12 MMOL/L — SIGNIFICANT CHANGE UP (ref 5–17)
APTT BLD: 29.4 SEC — SIGNIFICANT CHANGE UP (ref 24.5–35.6)
BASOPHILS # BLD AUTO: 0.07 K/UL — SIGNIFICANT CHANGE UP (ref 0–0.2)
BASOPHILS NFR BLD AUTO: 0.9 % — SIGNIFICANT CHANGE UP (ref 0–2)
BLD GP AB SCN SERPL QL: NEGATIVE — SIGNIFICANT CHANGE UP
BUN SERPL-MCNC: 13 MG/DL — SIGNIFICANT CHANGE UP (ref 7–23)
CALCIUM SERPL-MCNC: 8.9 MG/DL — SIGNIFICANT CHANGE UP (ref 8.4–10.5)
CHLORIDE SERPL-SCNC: 106 MMOL/L — SIGNIFICANT CHANGE UP (ref 96–108)
CO2 SERPL-SCNC: 25 MMOL/L — SIGNIFICANT CHANGE UP (ref 22–31)
CREAT SERPL-MCNC: 0.66 MG/DL — SIGNIFICANT CHANGE UP (ref 0.5–1.3)
EGFR: 95 ML/MIN/1.73M2 — SIGNIFICANT CHANGE UP
EOSINOPHIL # BLD AUTO: 0.31 K/UL — SIGNIFICANT CHANGE UP (ref 0–0.5)
EOSINOPHIL NFR BLD AUTO: 3.8 % — SIGNIFICANT CHANGE UP (ref 0–6)
GLUCOSE BLDC GLUCOMTR-MCNC: 104 MG/DL — HIGH (ref 70–99)
GLUCOSE SERPL-MCNC: 98 MG/DL — SIGNIFICANT CHANGE UP (ref 70–99)
HCT VFR BLD CALC: 29.7 % — LOW (ref 34.5–45)
HGB BLD-MCNC: 9.2 G/DL — LOW (ref 11.5–15.5)
IMM GRANULOCYTES NFR BLD AUTO: 0.1 % — SIGNIFICANT CHANGE UP (ref 0–0.9)
INR BLD: 1.04 RATIO — SIGNIFICANT CHANGE UP (ref 0.85–1.18)
LYMPHOCYTES # BLD AUTO: 1.34 K/UL — SIGNIFICANT CHANGE UP (ref 1–3.3)
LYMPHOCYTES # BLD AUTO: 16.4 % — SIGNIFICANT CHANGE UP (ref 13–44)
MCHC RBC-ENTMCNC: 25.6 PG — LOW (ref 27–34)
MCHC RBC-ENTMCNC: 31 GM/DL — LOW (ref 32–36)
MCV RBC AUTO: 82.7 FL — SIGNIFICANT CHANGE UP (ref 80–100)
MONOCYTES # BLD AUTO: 0.67 K/UL — SIGNIFICANT CHANGE UP (ref 0–0.9)
MONOCYTES NFR BLD AUTO: 8.2 % — SIGNIFICANT CHANGE UP (ref 2–14)
NEUTROPHILS # BLD AUTO: 5.75 K/UL — SIGNIFICANT CHANGE UP (ref 1.8–7.4)
NEUTROPHILS NFR BLD AUTO: 70.6 % — SIGNIFICANT CHANGE UP (ref 43–77)
NRBC # BLD: 0 /100 WBCS — SIGNIFICANT CHANGE UP (ref 0–0)
PLATELET # BLD AUTO: 253 K/UL — SIGNIFICANT CHANGE UP (ref 150–400)
POTASSIUM SERPL-MCNC: 4.4 MMOL/L — SIGNIFICANT CHANGE UP (ref 3.5–5.3)
POTASSIUM SERPL-SCNC: 4.4 MMOL/L — SIGNIFICANT CHANGE UP (ref 3.5–5.3)
PROTHROM AB SERPL-ACNC: 11.4 SEC — SIGNIFICANT CHANGE UP (ref 9.5–13)
RBC # BLD: 3.59 M/UL — LOW (ref 3.8–5.2)
RBC # FLD: 16.4 % — HIGH (ref 10.3–14.5)
RH IG SCN BLD-IMP: NEGATIVE — SIGNIFICANT CHANGE UP
SODIUM SERPL-SCNC: 143 MMOL/L — SIGNIFICANT CHANGE UP (ref 135–145)
WBC # BLD: 8.15 K/UL — SIGNIFICANT CHANGE UP (ref 3.8–10.5)
WBC # FLD AUTO: 8.15 K/UL — SIGNIFICANT CHANGE UP (ref 3.8–10.5)

## 2024-06-17 PROCEDURE — 71045 X-RAY EXAM CHEST 1 VIEW: CPT | Mod: 26

## 2024-06-17 PROCEDURE — 93010 ELECTROCARDIOGRAM REPORT: CPT

## 2024-06-17 PROCEDURE — 99222 1ST HOSP IP/OBS MODERATE 55: CPT | Mod: 57

## 2024-06-17 RX ORDER — CEFTRIAXONE SODIUM 500 MG
1000 VIAL (EA) INJECTION ONCE
Refills: 0 | Status: COMPLETED | OUTPATIENT
Start: 2024-06-17 | End: 2024-06-17

## 2024-06-17 RX ORDER — BACLOFEN 20 MG
30 TABLET ORAL EVERY 6 HOURS
Refills: 0 | Status: DISCONTINUED | OUTPATIENT
Start: 2024-06-17 | End: 2024-06-17

## 2024-06-17 RX ORDER — TIZANIDINE HCL 2 MG
8 TABLET ORAL AT BEDTIME
Refills: 0 | Status: DISCONTINUED | OUTPATIENT
Start: 2024-06-17 | End: 2024-06-17

## 2024-06-17 RX ORDER — CEFTRIAXONE SODIUM 500 MG
1000 VIAL (EA) INJECTION EVERY 24 HOURS
Refills: 0 | Status: DISCONTINUED | OUTPATIENT
Start: 2024-06-18 | End: 2024-06-18

## 2024-06-17 RX ORDER — ATORVASTATIN CALCIUM 20 MG/1
40 TABLET, FILM COATED ORAL AT BEDTIME
Refills: 0 | Status: DISCONTINUED | OUTPATIENT
Start: 2024-06-17 | End: 2024-06-18

## 2024-06-17 RX ORDER — CEFTRIAXONE SODIUM 500 MG
1000 VIAL (EA) INJECTION ONCE
Refills: 0 | Status: DISCONTINUED | OUTPATIENT
Start: 2024-06-17 | End: 2024-06-17

## 2024-06-17 RX ORDER — FLUCONAZOLE 200 MG
100 TABLET ORAL ONCE
Refills: 0 | Status: COMPLETED | OUTPATIENT
Start: 2024-06-17 | End: 2024-06-17

## 2024-06-17 RX ORDER — FLUCONAZOLE 200 MG
100 TABLET ORAL EVERY 24 HOURS
Refills: 0 | Status: DISCONTINUED | OUTPATIENT
Start: 2024-06-18 | End: 2024-06-18

## 2024-06-17 RX ORDER — ACETAMINOPHEN 325 MG
650 TABLET ORAL EVERY 6 HOURS
Refills: 0 | Status: DISCONTINUED | OUTPATIENT
Start: 2024-06-17 | End: 2024-06-18

## 2024-06-17 RX ORDER — CEFTRIAXONE SODIUM 500 MG
VIAL (EA) INJECTION
Refills: 0 | Status: DISCONTINUED | OUTPATIENT
Start: 2024-06-17 | End: 2024-06-18

## 2024-06-17 RX ORDER — CEFTRIAXONE SODIUM 500 MG
VIAL (EA) INJECTION
Refills: 0 | Status: DISCONTINUED | OUTPATIENT
Start: 2024-06-17 | End: 2024-06-17

## 2024-06-17 RX ORDER — OXYBUTYNIN CHLORIDE 5 MG
10 TABLET ORAL
Refills: 0 | Status: DISCONTINUED | OUTPATIENT
Start: 2024-06-17 | End: 2024-06-18

## 2024-06-17 RX ORDER — FLUCONAZOLE 200 MG
TABLET ORAL
Refills: 0 | Status: DISCONTINUED | OUTPATIENT
Start: 2024-06-17 | End: 2024-06-18

## 2024-06-17 RX ORDER — MYCOPHENOLATE MOFETIL 500 MG/1
1000 TABLET, FILM COATED ORAL
Refills: 0 | Status: DISCONTINUED | OUTPATIENT
Start: 2024-06-17 | End: 2024-06-17

## 2024-06-17 RX ORDER — SENNOSIDES 8.6 MG
2 TABLET ORAL AT BEDTIME
Refills: 0 | Status: DISCONTINUED | OUTPATIENT
Start: 2024-06-17 | End: 2024-06-18

## 2024-06-17 RX ORDER — DEXTROSE MONOHYDRATE AND SODIUM CHLORIDE 5; .3 G/100ML; G/100ML
1000 INJECTION, SOLUTION INTRAVENOUS
Refills: 0 | Status: DISCONTINUED | OUTPATIENT
Start: 2024-06-17 | End: 2024-06-18

## 2024-06-17 RX ORDER — BACLOFEN 20 MG
30 TABLET ORAL
Refills: 0 | Status: DISCONTINUED | OUTPATIENT
Start: 2024-06-17 | End: 2024-06-18

## 2024-06-17 RX ORDER — HEPARIN SODIUM 50 [USP'U]/ML
5000 INJECTION, SOLUTION INTRAVENOUS EVERY 8 HOURS
Refills: 0 | Status: DISCONTINUED | OUTPATIENT
Start: 2024-06-17 | End: 2024-06-18

## 2024-06-17 RX ORDER — TIZANIDINE HCL 2 MG
8 TABLET ORAL AT BEDTIME
Refills: 0 | Status: DISCONTINUED | OUTPATIENT
Start: 2024-06-18 | End: 2024-06-18

## 2024-06-17 RX ORDER — ALBUTEROL 90 MCG
2 AEROSOL REFILL (GRAM) INHALATION EVERY 6 HOURS
Refills: 0 | Status: DISCONTINUED | OUTPATIENT
Start: 2024-06-17 | End: 2024-06-18

## 2024-06-17 RX ADMIN — HEPARIN SODIUM 5000 UNIT(S): 50 INJECTION, SOLUTION INTRAVENOUS at 22:30

## 2024-06-17 RX ADMIN — ATORVASTATIN CALCIUM 40 MILLIGRAM(S): 20 TABLET, FILM COATED ORAL at 22:30

## 2024-06-18 ENCOUNTER — RESULT REVIEW (OUTPATIENT)
Age: 69
End: 2024-06-18

## 2024-06-18 ENCOUNTER — APPOINTMENT (OUTPATIENT)
Dept: UROLOGY | Facility: HOSPITAL | Age: 69
End: 2024-06-18

## 2024-06-18 DIAGNOSIS — D63.8 ANEMIA IN OTHER CHRONIC DISEASES CLASSIFIED ELSEWHERE: ICD-10-CM

## 2024-06-18 DIAGNOSIS — Z86.19 PERSONAL HISTORY OF OTHER INFECTIOUS AND PARASITIC DISEASES: ICD-10-CM

## 2024-06-18 LAB
ANION GAP SERPL CALC-SCNC: 13 MMOL/L — SIGNIFICANT CHANGE UP (ref 5–17)
BUN SERPL-MCNC: 10 MG/DL — SIGNIFICANT CHANGE UP (ref 7–23)
CALCIUM SERPL-MCNC: 8.6 MG/DL — SIGNIFICANT CHANGE UP (ref 8.4–10.5)
CHLORIDE SERPL-SCNC: 107 MMOL/L — SIGNIFICANT CHANGE UP (ref 96–108)
CO2 SERPL-SCNC: 23 MMOL/L — SIGNIFICANT CHANGE UP (ref 22–31)
CREAT SERPL-MCNC: 0.68 MG/DL — SIGNIFICANT CHANGE UP (ref 0.5–1.3)
EGFR: 94 ML/MIN/1.73M2 — SIGNIFICANT CHANGE UP
GLUCOSE SERPL-MCNC: 118 MG/DL — HIGH (ref 70–99)
HCT VFR BLD CALC: 40.4 % — SIGNIFICANT CHANGE UP (ref 34.5–45)
HGB BLD-MCNC: 12.3 G/DL — SIGNIFICANT CHANGE UP (ref 11.5–15.5)
MCHC RBC-ENTMCNC: 26.3 PG — LOW (ref 27–34)
MCHC RBC-ENTMCNC: 30.4 GM/DL — LOW (ref 32–36)
MCV RBC AUTO: 86.5 FL — SIGNIFICANT CHANGE UP (ref 80–100)
NRBC # BLD: 0 /100 WBCS — SIGNIFICANT CHANGE UP (ref 0–0)
PLATELET # BLD AUTO: 220 K/UL — SIGNIFICANT CHANGE UP (ref 150–400)
POTASSIUM SERPL-MCNC: 4.3 MMOL/L — SIGNIFICANT CHANGE UP (ref 3.5–5.3)
POTASSIUM SERPL-SCNC: 4.3 MMOL/L — SIGNIFICANT CHANGE UP (ref 3.5–5.3)
RBC # BLD: 4.67 M/UL — SIGNIFICANT CHANGE UP (ref 3.8–5.2)
RBC # FLD: 16.3 % — HIGH (ref 10.3–14.5)
SODIUM SERPL-SCNC: 143 MMOL/L — SIGNIFICANT CHANGE UP (ref 135–145)
WBC # BLD: 10.45 K/UL — SIGNIFICANT CHANGE UP (ref 3.8–10.5)
WBC # FLD AUTO: 10.45 K/UL — SIGNIFICANT CHANGE UP (ref 3.8–10.5)

## 2024-06-18 PROCEDURE — 88300 SURGICAL PATH GROSS: CPT | Mod: 26

## 2024-06-18 PROCEDURE — 71045 X-RAY EXAM CHEST 1 VIEW: CPT | Mod: 26

## 2024-06-18 PROCEDURE — 50437 DILAT XST TRC NEW ACCESS RCS: CPT | Mod: RT,59

## 2024-06-18 PROCEDURE — 74420 UROGRAPHY RTRGR +-KUB: CPT | Mod: 26

## 2024-06-18 PROCEDURE — 52310 CYSTOSCOPY AND TREATMENT: CPT | Mod: RT,59

## 2024-06-18 PROCEDURE — 50081 PERQ NL/PL LITHOTRP CPLX>2CM: CPT | Mod: RT

## 2024-06-18 PROCEDURE — 74176 CT ABD & PELVIS W/O CONTRAST: CPT | Mod: 26

## 2024-06-18 DEVICE — DILATOR SHEATH SET 8/10: Type: IMPLANTABLE DEVICE | Site: RIGHT | Status: FUNCTIONAL

## 2024-06-18 DEVICE — NEPHROSTOMY BALLOON CATH NEPHROMAX 24FR 17CM PTFE: Type: IMPLANTABLE DEVICE | Site: RIGHT | Status: FUNCTIONAL

## 2024-06-18 DEVICE — SURGIFLO MATRIX WITH THROMBIN KIT: Type: IMPLANTABLE DEVICE | Site: RIGHT | Status: FUNCTIONAL

## 2024-06-18 DEVICE — TRILOGY PROBE KIT 3.9MM X 440MM (BLUE): Type: IMPLANTABLE DEVICE | Site: RIGHT | Status: FUNCTIONAL

## 2024-06-18 DEVICE — NEPHROURETERAL STENT PERCUFLEX 8FR X 22CM: Type: IMPLANTABLE DEVICE | Site: RIGHT | Status: FUNCTIONAL

## 2024-06-18 DEVICE — STONE BASKET ZEROTIP NITINOL 4-WIRE 1.9FR 120CM X 12MM: Type: IMPLANTABLE DEVICE | Site: RIGHT | Status: FUNCTIONAL

## 2024-06-18 RX ORDER — CEFTRIAXONE SODIUM 500 MG
1000 VIAL (EA) INJECTION EVERY 24 HOURS
Refills: 0 | Status: DISCONTINUED | OUTPATIENT
Start: 2024-06-18 | End: 2024-06-20

## 2024-06-18 RX ORDER — DEXTROSE MONOHYDRATE AND SODIUM CHLORIDE 5; .3 G/100ML; G/100ML
1000 INJECTION, SOLUTION INTRAVENOUS
Refills: 0 | Status: DISCONTINUED | OUTPATIENT
Start: 2024-06-18 | End: 2024-06-18

## 2024-06-18 RX ORDER — HYDROMORPHONE HCL 0.2 MG/ML
0.5 INJECTION, SOLUTION INTRAVENOUS
Refills: 0 | Status: DISCONTINUED | OUTPATIENT
Start: 2024-06-18 | End: 2024-06-18

## 2024-06-18 RX ORDER — ONDANSETRON HYDROCHLORIDE 2 MG/ML
4 INJECTION INTRAMUSCULAR; INTRAVENOUS ONCE
Refills: 0 | Status: DISCONTINUED | OUTPATIENT
Start: 2024-06-18 | End: 2024-06-18

## 2024-06-18 RX ORDER — FLUCONAZOLE 200 MG
100 TABLET ORAL EVERY 24 HOURS
Refills: 0 | Status: DISCONTINUED | OUTPATIENT
Start: 2024-06-18 | End: 2024-06-20

## 2024-06-18 RX ORDER — TIZANIDINE HCL 2 MG
8 TABLET ORAL AT BEDTIME
Refills: 0 | Status: DISCONTINUED | OUTPATIENT
Start: 2024-06-18 | End: 2024-06-20

## 2024-06-18 RX ORDER — SIMETHICONE 40MG/0.6ML
80 SUSPENSION, DROPS(FINAL DOSAGE FORM)(ML) ORAL EVERY 6 HOURS
Refills: 0 | Status: DISCONTINUED | OUTPATIENT
Start: 2024-06-18 | End: 2024-06-20

## 2024-06-18 RX ORDER — HEPARIN SODIUM 50 [USP'U]/ML
5000 INJECTION, SOLUTION INTRAVENOUS EVERY 8 HOURS
Refills: 0 | Status: DISCONTINUED | OUTPATIENT
Start: 2024-06-18 | End: 2024-06-20

## 2024-06-18 RX ORDER — ACETAMINOPHEN 325 MG
650 TABLET ORAL EVERY 6 HOURS
Refills: 0 | Status: DISCONTINUED | OUTPATIENT
Start: 2024-06-18 | End: 2024-06-20

## 2024-06-18 RX ORDER — LIDOCAINE HYDROCHLORIDE 20 MG/ML
0.2 INJECTION, SOLUTION EPIDURAL; INFILTRATION; INTRACAUDAL; PERINEURAL ONCE
Refills: 0 | Status: COMPLETED | OUTPATIENT
Start: 2024-06-18 | End: 2024-06-18

## 2024-06-18 RX ORDER — OXYBUTYNIN CHLORIDE 5 MG
10 TABLET ORAL
Refills: 0 | Status: DISCONTINUED | OUTPATIENT
Start: 2024-06-18 | End: 2024-06-20

## 2024-06-18 RX ORDER — SENNOSIDES 8.6 MG
2 TABLET ORAL AT BEDTIME
Refills: 0 | Status: DISCONTINUED | OUTPATIENT
Start: 2024-06-18 | End: 2024-06-20

## 2024-06-18 RX ORDER — BACLOFEN 20 MG
30 TABLET ORAL
Refills: 0 | Status: DISCONTINUED | OUTPATIENT
Start: 2024-06-18 | End: 2024-06-20

## 2024-06-18 RX ORDER — ATORVASTATIN CALCIUM 20 MG/1
40 TABLET, FILM COATED ORAL AT BEDTIME
Refills: 0 | Status: DISCONTINUED | OUTPATIENT
Start: 2024-06-18 | End: 2024-06-20

## 2024-06-18 RX ORDER — ALBUTEROL 90 MCG
2 AEROSOL REFILL (GRAM) INHALATION EVERY 6 HOURS
Refills: 0 | Status: DISCONTINUED | OUTPATIENT
Start: 2024-06-18 | End: 2024-06-20

## 2024-06-18 RX ADMIN — Medication 10 MILLIGRAM(S): at 05:41

## 2024-06-18 RX ADMIN — Medication 8 MILLIGRAM(S): at 23:56

## 2024-06-18 RX ADMIN — Medication 30 MILLIGRAM(S): at 18:01

## 2024-06-18 RX ADMIN — Medication 30 MILLIGRAM(S): at 05:40

## 2024-06-18 RX ADMIN — HEPARIN SODIUM 5000 UNIT(S): 50 INJECTION, SOLUTION INTRAVENOUS at 14:27

## 2024-06-18 RX ADMIN — Medication 100 MILLIGRAM(S): at 01:54

## 2024-06-18 RX ADMIN — Medication 100 MILLIGRAM(S): at 00:40

## 2024-06-18 RX ADMIN — Medication 80 MILLIGRAM(S): at 18:57

## 2024-06-18 RX ADMIN — Medication 3 MILLILITER(S): at 22:47

## 2024-06-18 RX ADMIN — HEPARIN SODIUM 5000 UNIT(S): 50 INJECTION, SOLUTION INTRAVENOUS at 22:23

## 2024-06-18 RX ADMIN — Medication 2000 UNIT(S): at 14:27

## 2024-06-18 RX ADMIN — HEPARIN SODIUM 5000 UNIT(S): 50 INJECTION, SOLUTION INTRAVENOUS at 05:41

## 2024-06-18 RX ADMIN — Medication 100 MILLIGRAM(S): at 23:56

## 2024-06-19 ENCOUNTER — TRANSCRIPTION ENCOUNTER (OUTPATIENT)
Age: 69
End: 2024-06-19

## 2024-06-19 LAB
ANION GAP SERPL CALC-SCNC: 17 MMOL/L — SIGNIFICANT CHANGE UP (ref 5–17)
BUN SERPL-MCNC: 18 MG/DL — SIGNIFICANT CHANGE UP (ref 7–23)
CALCIUM SERPL-MCNC: 9 MG/DL — SIGNIFICANT CHANGE UP (ref 8.4–10.5)
CHLORIDE SERPL-SCNC: 102 MMOL/L — SIGNIFICANT CHANGE UP (ref 96–108)
CO2 SERPL-SCNC: 21 MMOL/L — LOW (ref 22–31)
CREAT SERPL-MCNC: 0.99 MG/DL — SIGNIFICANT CHANGE UP (ref 0.5–1.3)
EGFR: 62 ML/MIN/1.73M2 — SIGNIFICANT CHANGE UP
GLUCOSE SERPL-MCNC: 110 MG/DL — HIGH (ref 70–99)
HCT VFR BLD CALC: 34.8 % — SIGNIFICANT CHANGE UP (ref 34.5–45)
HGB BLD-MCNC: 10.8 G/DL — LOW (ref 11.5–15.5)
MCHC RBC-ENTMCNC: 26.9 PG — LOW (ref 27–34)
MCHC RBC-ENTMCNC: 31 GM/DL — LOW (ref 32–36)
MCV RBC AUTO: 86.8 FL — SIGNIFICANT CHANGE UP (ref 80–100)
NRBC # BLD: 0 /100 WBCS — SIGNIFICANT CHANGE UP (ref 0–0)
PLATELET # BLD AUTO: 233 K/UL — SIGNIFICANT CHANGE UP (ref 150–400)
POTASSIUM SERPL-MCNC: 4.5 MMOL/L — SIGNIFICANT CHANGE UP (ref 3.5–5.3)
POTASSIUM SERPL-SCNC: 4.5 MMOL/L — SIGNIFICANT CHANGE UP (ref 3.5–5.3)
RBC # BLD: 4.01 M/UL — SIGNIFICANT CHANGE UP (ref 3.8–5.2)
RBC # FLD: 17.1 % — HIGH (ref 10.3–14.5)
SODIUM SERPL-SCNC: 140 MMOL/L — SIGNIFICANT CHANGE UP (ref 135–145)
WBC # BLD: 18.54 K/UL — HIGH (ref 3.8–10.5)
WBC # FLD AUTO: 18.54 K/UL — HIGH (ref 3.8–10.5)

## 2024-06-19 PROCEDURE — 99223 1ST HOSP IP/OBS HIGH 75: CPT

## 2024-06-19 PROCEDURE — 99222 1ST HOSP IP/OBS MODERATE 55: CPT

## 2024-06-19 RX ORDER — DEXTROSE MONOHYDRATE AND SODIUM CHLORIDE 5; .3 G/100ML; G/100ML
1000 INJECTION, SOLUTION INTRAVENOUS
Refills: 0 | Status: DISCONTINUED | OUTPATIENT
Start: 2024-06-19 | End: 2024-06-20

## 2024-06-19 RX ADMIN — Medication 30 MILLIGRAM(S): at 11:29

## 2024-06-19 RX ADMIN — Medication 10 MILLIGRAM(S): at 06:48

## 2024-06-19 RX ADMIN — Medication 650 MILLIGRAM(S): at 18:26

## 2024-06-19 RX ADMIN — Medication 650 MILLIGRAM(S): at 17:56

## 2024-06-19 RX ADMIN — Medication 3 MILLILITER(S): at 08:20

## 2024-06-19 RX ADMIN — Medication 3 MILLILITER(S): at 14:10

## 2024-06-19 RX ADMIN — HEPARIN SODIUM 5000 UNIT(S): 50 INJECTION, SOLUTION INTRAVENOUS at 06:48

## 2024-06-19 RX ADMIN — Medication 30 MILLIGRAM(S): at 06:55

## 2024-06-19 RX ADMIN — HEPARIN SODIUM 5000 UNIT(S): 50 INJECTION, SOLUTION INTRAVENOUS at 21:25

## 2024-06-19 RX ADMIN — Medication 30 MILLIGRAM(S): at 17:57

## 2024-06-19 RX ADMIN — Medication 650 MILLIGRAM(S): at 23:59

## 2024-06-19 RX ADMIN — ATORVASTATIN CALCIUM 40 MILLIGRAM(S): 20 TABLET, FILM COATED ORAL at 21:26

## 2024-06-19 RX ADMIN — HEPARIN SODIUM 5000 UNIT(S): 50 INJECTION, SOLUTION INTRAVENOUS at 13:09

## 2024-06-19 RX ADMIN — Medication 10 MILLIGRAM(S): at 17:57

## 2024-06-19 RX ADMIN — Medication 100 MILLIGRAM(S): at 23:59

## 2024-06-19 RX ADMIN — Medication 3 MILLILITER(S): at 21:20

## 2024-06-19 RX ADMIN — Medication 2000 UNIT(S): at 11:29

## 2024-06-19 RX ADMIN — Medication 50 MILLIGRAM(S): at 00:35

## 2024-06-20 ENCOUNTER — APPOINTMENT (OUTPATIENT)
Dept: UROLOGY | Facility: HOSPITAL | Age: 69
End: 2024-06-20

## 2024-06-20 ENCOUNTER — RESULT REVIEW (OUTPATIENT)
Age: 69
End: 2024-06-20

## 2024-06-20 LAB
ANION GAP SERPL CALC-SCNC: 14 MMOL/L — SIGNIFICANT CHANGE UP (ref 5–17)
BLD GP AB SCN SERPL QL: NEGATIVE — SIGNIFICANT CHANGE UP
BUN SERPL-MCNC: 16 MG/DL — SIGNIFICANT CHANGE UP (ref 7–23)
CALCIUM SERPL-MCNC: 8.7 MG/DL — SIGNIFICANT CHANGE UP (ref 8.4–10.5)
CHLORIDE SERPL-SCNC: 104 MMOL/L — SIGNIFICANT CHANGE UP (ref 96–108)
CO2 SERPL-SCNC: 21 MMOL/L — LOW (ref 22–31)
CREAT SERPL-MCNC: 0.83 MG/DL — SIGNIFICANT CHANGE UP (ref 0.5–1.3)
EGFR: 76 ML/MIN/1.73M2 — SIGNIFICANT CHANGE UP
GLUCOSE SERPL-MCNC: 109 MG/DL — HIGH (ref 70–99)
HCT VFR BLD CALC: 34.8 % — SIGNIFICANT CHANGE UP (ref 34.5–45)
HGB BLD-MCNC: 10.9 G/DL — LOW (ref 11.5–15.5)
MCHC RBC-ENTMCNC: 26.7 PG — LOW (ref 27–34)
MCHC RBC-ENTMCNC: 31.3 GM/DL — LOW (ref 32–36)
MCV RBC AUTO: 85.3 FL — SIGNIFICANT CHANGE UP (ref 80–100)
NRBC # BLD: 0 /100 WBCS — SIGNIFICANT CHANGE UP (ref 0–0)
PLATELET # BLD AUTO: 193 K/UL — SIGNIFICANT CHANGE UP (ref 150–400)
POTASSIUM SERPL-MCNC: 3.8 MMOL/L — SIGNIFICANT CHANGE UP (ref 3.5–5.3)
POTASSIUM SERPL-SCNC: 3.8 MMOL/L — SIGNIFICANT CHANGE UP (ref 3.5–5.3)
RBC # BLD: 4.08 M/UL — SIGNIFICANT CHANGE UP (ref 3.8–5.2)
RBC # FLD: 17.2 % — HIGH (ref 10.3–14.5)
RH IG SCN BLD-IMP: NEGATIVE — SIGNIFICANT CHANGE UP
SODIUM SERPL-SCNC: 139 MMOL/L — SIGNIFICANT CHANGE UP (ref 135–145)
SURGICAL PATHOLOGY STUDY: SIGNIFICANT CHANGE UP
WBC # BLD: 14.26 K/UL — HIGH (ref 3.8–10.5)
WBC # FLD AUTO: 14.26 K/UL — HIGH (ref 3.8–10.5)

## 2024-06-20 PROCEDURE — 88300 SURGICAL PATH GROSS: CPT | Mod: 26

## 2024-06-20 PROCEDURE — 74420 UROGRAPHY RTRGR +-KUB: CPT | Mod: 26

## 2024-06-20 PROCEDURE — 99232 SBSQ HOSP IP/OBS MODERATE 35: CPT

## 2024-06-20 PROCEDURE — 52352 CYSTOURETERO W/STONE REMOVE: CPT | Mod: LT,79

## 2024-06-20 DEVICE — IMPLANTABLE DEVICE: Type: IMPLANTABLE DEVICE | Status: FUNCTIONAL

## 2024-06-20 DEVICE — GUIDEWIRE SENSOR DUAL-FLEX NITINOL STRAIGHT .035" X 150CM: Type: IMPLANTABLE DEVICE | Status: FUNCTIONAL

## 2024-06-20 DEVICE — URETERAL CATH IMAGER II BERN 5FR 65CM: Type: IMPLANTABLE DEVICE | Status: FUNCTIONAL

## 2024-06-20 DEVICE — URETERAL CATH SOF-FLEX OPEN END 6FR .040" X 70CM: Type: IMPLANTABLE DEVICE | Status: FUNCTIONAL

## 2024-06-20 DEVICE — URETERAL STENT INLAY OPTIMA 8FR 24CM: Type: IMPLANTABLE DEVICE | Status: FUNCTIONAL

## 2024-06-20 RX ORDER — SENNOSIDES 8.6 MG
2 TABLET ORAL AT BEDTIME
Refills: 0 | Status: DISCONTINUED | OUTPATIENT
Start: 2024-06-20 | End: 2024-06-21

## 2024-06-20 RX ORDER — ATORVASTATIN CALCIUM 20 MG/1
40 TABLET, FILM COATED ORAL AT BEDTIME
Refills: 0 | Status: DISCONTINUED | OUTPATIENT
Start: 2024-06-20 | End: 2024-06-21

## 2024-06-20 RX ORDER — HYDROMORPHONE HCL 0.2 MG/ML
0.25 INJECTION, SOLUTION INTRAVENOUS
Refills: 0 | Status: DISCONTINUED | OUTPATIENT
Start: 2024-06-20 | End: 2024-06-20

## 2024-06-20 RX ORDER — FLUCONAZOLE 200 MG
100 TABLET ORAL EVERY 24 HOURS
Refills: 0 | Status: DISCONTINUED | OUTPATIENT
Start: 2024-06-21 | End: 2024-06-21

## 2024-06-20 RX ORDER — ONDANSETRON HYDROCHLORIDE 2 MG/ML
4 INJECTION INTRAMUSCULAR; INTRAVENOUS ONCE
Refills: 0 | Status: DISCONTINUED | OUTPATIENT
Start: 2024-06-20 | End: 2024-06-20

## 2024-06-20 RX ORDER — TIZANIDINE HCL 2 MG
8 TABLET ORAL AT BEDTIME
Refills: 0 | Status: DISCONTINUED | OUTPATIENT
Start: 2024-06-20 | End: 2024-06-21

## 2024-06-20 RX ORDER — SIMETHICONE 40MG/0.6ML
80 SUSPENSION, DROPS(FINAL DOSAGE FORM)(ML) ORAL EVERY 6 HOURS
Refills: 0 | Status: DISCONTINUED | OUTPATIENT
Start: 2024-06-20 | End: 2024-06-21

## 2024-06-20 RX ORDER — ALBUTEROL 90 MCG
2 AEROSOL REFILL (GRAM) INHALATION EVERY 6 HOURS
Refills: 0 | Status: DISCONTINUED | OUTPATIENT
Start: 2024-06-20 | End: 2024-06-21

## 2024-06-20 RX ORDER — ACETAMINOPHEN 325 MG
650 TABLET ORAL EVERY 6 HOURS
Refills: 0 | Status: DISCONTINUED | OUTPATIENT
Start: 2024-06-20 | End: 2024-06-21

## 2024-06-20 RX ORDER — BACLOFEN 20 MG
30 TABLET ORAL
Refills: 0 | Status: DISCONTINUED | OUTPATIENT
Start: 2024-06-20 | End: 2024-06-21

## 2024-06-20 RX ORDER — SODIUM CHLORIDE 0.9 % (FLUSH) 0.9 %
3 SYRINGE (ML) INJECTION EVERY 8 HOURS
Refills: 0 | Status: DISCONTINUED | OUTPATIENT
Start: 2024-06-20 | End: 2024-06-21

## 2024-06-20 RX ORDER — CEFTRIAXONE SODIUM 500 MG
1000 VIAL (EA) INJECTION EVERY 24 HOURS
Refills: 0 | Status: DISCONTINUED | OUTPATIENT
Start: 2024-06-20 | End: 2024-06-21

## 2024-06-20 RX ORDER — DEXTROSE MONOHYDRATE AND SODIUM CHLORIDE 5; .3 G/100ML; G/100ML
1000 INJECTION, SOLUTION INTRAVENOUS
Refills: 0 | Status: DISCONTINUED | OUTPATIENT
Start: 2024-06-20 | End: 2024-06-21

## 2024-06-20 RX ORDER — HEPARIN SODIUM 50 [USP'U]/ML
5000 INJECTION, SOLUTION INTRAVENOUS EVERY 8 HOURS
Refills: 0 | Status: DISCONTINUED | OUTPATIENT
Start: 2024-06-20 | End: 2024-06-21

## 2024-06-20 RX ADMIN — DEXTROSE MONOHYDRATE AND SODIUM CHLORIDE 75 MILLILITER(S): 5; .3 INJECTION, SOLUTION INTRAVENOUS at 11:19

## 2024-06-20 RX ADMIN — Medication 30 MILLIGRAM(S): at 17:17

## 2024-06-20 RX ADMIN — HEPARIN SODIUM 5000 UNIT(S): 50 INJECTION, SOLUTION INTRAVENOUS at 21:41

## 2024-06-20 RX ADMIN — Medication 650 MILLIGRAM(S): at 22:39

## 2024-06-20 RX ADMIN — Medication 30 MILLIGRAM(S): at 05:12

## 2024-06-20 RX ADMIN — Medication 8 MILLIGRAM(S): at 21:40

## 2024-06-20 RX ADMIN — Medication 650 MILLIGRAM(S): at 00:59

## 2024-06-20 RX ADMIN — Medication 3 MILLILITER(S): at 04:53

## 2024-06-20 RX ADMIN — HEPARIN SODIUM 5000 UNIT(S): 50 INJECTION, SOLUTION INTRAVENOUS at 13:14

## 2024-06-20 RX ADMIN — HEPARIN SODIUM 5000 UNIT(S): 50 INJECTION, SOLUTION INTRAVENOUS at 05:41

## 2024-06-20 RX ADMIN — Medication 100 MILLIGRAM(S): at 22:15

## 2024-06-20 RX ADMIN — Medication 10 MILLIGRAM(S): at 05:13

## 2024-06-20 RX ADMIN — Medication 2000 UNIT(S): at 13:14

## 2024-06-20 RX ADMIN — Medication 50 MILLIGRAM(S): at 00:38

## 2024-06-20 RX ADMIN — Medication 3 MILLILITER(S): at 19:59

## 2024-06-20 RX ADMIN — ATORVASTATIN CALCIUM 40 MILLIGRAM(S): 20 TABLET, FILM COATED ORAL at 21:39

## 2024-06-20 RX ADMIN — Medication 650 MILLIGRAM(S): at 21:39

## 2024-06-21 ENCOUNTER — TRANSCRIPTION ENCOUNTER (OUTPATIENT)
Age: 69
End: 2024-06-21

## 2024-06-21 VITALS
SYSTOLIC BLOOD PRESSURE: 157 MMHG | RESPIRATION RATE: 18 BRPM | OXYGEN SATURATION: 95 % | HEART RATE: 65 BPM | DIASTOLIC BLOOD PRESSURE: 75 MMHG | TEMPERATURE: 98 F

## 2024-06-21 LAB
ANION GAP SERPL CALC-SCNC: 10 MMOL/L — SIGNIFICANT CHANGE UP (ref 5–17)
BUN SERPL-MCNC: 13 MG/DL — SIGNIFICANT CHANGE UP (ref 7–23)
CALCIUM SERPL-MCNC: 8.6 MG/DL — SIGNIFICANT CHANGE UP (ref 8.4–10.5)
CHLORIDE SERPL-SCNC: 104 MMOL/L — SIGNIFICANT CHANGE UP (ref 96–108)
CO2 SERPL-SCNC: 26 MMOL/L — SIGNIFICANT CHANGE UP (ref 22–31)
CREAT SERPL-MCNC: 0.62 MG/DL — SIGNIFICANT CHANGE UP (ref 0.5–1.3)
CULTURE RESULTS: ABNORMAL
CULTURE RESULTS: NO GROWTH — SIGNIFICANT CHANGE UP
EGFR: 96 ML/MIN/1.73M2 — SIGNIFICANT CHANGE UP
GLUCOSE SERPL-MCNC: 154 MG/DL — HIGH (ref 70–99)
HCT VFR BLD CALC: 31.9 % — LOW (ref 34.5–45)
HGB BLD-MCNC: 9.9 G/DL — LOW (ref 11.5–15.5)
MCHC RBC-ENTMCNC: 27 PG — SIGNIFICANT CHANGE UP (ref 27–34)
MCHC RBC-ENTMCNC: 31 GM/DL — LOW (ref 32–36)
MCV RBC AUTO: 86.9 FL — SIGNIFICANT CHANGE UP (ref 80–100)
NRBC # BLD: 0 /100 WBCS — SIGNIFICANT CHANGE UP (ref 0–0)
PLATELET # BLD AUTO: 204 K/UL — SIGNIFICANT CHANGE UP (ref 150–400)
POTASSIUM SERPL-MCNC: 4.4 MMOL/L — SIGNIFICANT CHANGE UP (ref 3.5–5.3)
POTASSIUM SERPL-SCNC: 4.4 MMOL/L — SIGNIFICANT CHANGE UP (ref 3.5–5.3)
RBC # BLD: 3.67 M/UL — LOW (ref 3.8–5.2)
RBC # FLD: 17.3 % — HIGH (ref 10.3–14.5)
SODIUM SERPL-SCNC: 140 MMOL/L — SIGNIFICANT CHANGE UP (ref 135–145)
SPECIMEN SOURCE: SIGNIFICANT CHANGE UP
SPECIMEN SOURCE: SIGNIFICANT CHANGE UP
WBC # BLD: 8.44 K/UL — SIGNIFICANT CHANGE UP (ref 3.8–10.5)
WBC # FLD AUTO: 8.44 K/UL — SIGNIFICANT CHANGE UP (ref 3.8–10.5)

## 2024-06-21 PROCEDURE — 85027 COMPLETE CBC AUTOMATED: CPT

## 2024-06-21 PROCEDURE — 86901 BLOOD TYPING SEROLOGIC RH(D): CPT

## 2024-06-21 PROCEDURE — 80048 BASIC METABOLIC PNL TOTAL CA: CPT

## 2024-06-21 PROCEDURE — 86923 COMPATIBILITY TEST ELECTRIC: CPT

## 2024-06-21 PROCEDURE — C9399: CPT

## 2024-06-21 PROCEDURE — 71045 X-RAY EXAM CHEST 1 VIEW: CPT

## 2024-06-21 PROCEDURE — P9016: CPT

## 2024-06-21 PROCEDURE — C1887: CPT

## 2024-06-21 PROCEDURE — 82962 GLUCOSE BLOOD TEST: CPT

## 2024-06-21 PROCEDURE — 85730 THROMBOPLASTIN TIME PARTIAL: CPT

## 2024-06-21 PROCEDURE — 87070 CULTURE OTHR SPECIMN AEROBIC: CPT

## 2024-06-21 PROCEDURE — C2625: CPT

## 2024-06-21 PROCEDURE — 85610 PROTHROMBIN TIME: CPT

## 2024-06-21 PROCEDURE — 93005 ELECTROCARDIOGRAM TRACING: CPT

## 2024-06-21 PROCEDURE — 82365 CALCULUS SPECTROSCOPY: CPT

## 2024-06-21 PROCEDURE — 88300 SURGICAL PATH GROSS: CPT

## 2024-06-21 PROCEDURE — 36415 COLL VENOUS BLD VENIPUNCTURE: CPT

## 2024-06-21 PROCEDURE — 74176 CT ABD & PELVIS W/O CONTRAST: CPT | Mod: MC

## 2024-06-21 PROCEDURE — 87086 URINE CULTURE/COLONY COUNT: CPT

## 2024-06-21 PROCEDURE — C2617: CPT

## 2024-06-21 PROCEDURE — 85025 COMPLETE CBC W/AUTO DIFF WBC: CPT

## 2024-06-21 PROCEDURE — 86900 BLOOD TYPING SEROLOGIC ABO: CPT

## 2024-06-21 PROCEDURE — 86850 RBC ANTIBODY SCREEN: CPT

## 2024-06-21 PROCEDURE — C1726: CPT

## 2024-06-21 PROCEDURE — 76000 FLUOROSCOPY <1 HR PHYS/QHP: CPT

## 2024-06-21 PROCEDURE — C1894: CPT

## 2024-06-21 PROCEDURE — C1769: CPT

## 2024-06-21 PROCEDURE — 97161 PT EVAL LOW COMPLEX 20 MIN: CPT

## 2024-06-21 PROCEDURE — C1889: CPT

## 2024-06-21 PROCEDURE — 87077 CULTURE AEROBIC IDENTIFY: CPT

## 2024-06-21 PROCEDURE — C1758: CPT

## 2024-06-21 RX ORDER — ACETAMINOPHEN 325 MG
2 TABLET ORAL
Qty: 0 | Refills: 0 | DISCHARGE
Start: 2024-06-21

## 2024-06-21 RX ORDER — CEFPODOXIME PROXETIL 50 MG/5 ML
1 SUSPENSION, RECONSTITUTED, ORAL (ML) ORAL
Qty: 6 | Refills: 0
Start: 2024-06-21 | End: 2024-06-23

## 2024-06-21 RX ADMIN — Medication 3 MILLILITER(S): at 05:50

## 2024-06-21 RX ADMIN — Medication 2000 UNIT(S): at 13:05

## 2024-06-21 RX ADMIN — Medication 30 MILLIGRAM(S): at 13:06

## 2024-06-21 RX ADMIN — HEPARIN SODIUM 5000 UNIT(S): 50 INJECTION, SOLUTION INTRAVENOUS at 13:06

## 2024-06-21 RX ADMIN — HEPARIN SODIUM 5000 UNIT(S): 50 INJECTION, SOLUTION INTRAVENOUS at 05:59

## 2024-06-21 RX ADMIN — Medication 30 MILLIGRAM(S): at 05:59

## 2024-06-21 RX ADMIN — Medication 50 MILLIGRAM(S): at 05:58

## 2024-06-22 LAB
CULTURE RESULTS: NO GROWTH — SIGNIFICANT CHANGE UP
SPECIMEN SOURCE: SIGNIFICANT CHANGE UP

## 2024-06-25 LAB
CELL MATERIAL STONE EST-MCNT: SIGNIFICANT CHANGE UP
LABORATORY COMMENT REPORT: SIGNIFICANT CHANGE UP
NIDUS STONE QN: SIGNIFICANT CHANGE UP
SURGICAL PATHOLOGY STUDY: SIGNIFICANT CHANGE UP

## 2024-06-26 LAB
CELL MATERIAL STONE EST-MCNT: SIGNIFICANT CHANGE UP
LABORATORY COMMENT REPORT: SIGNIFICANT CHANGE UP
NIDUS STONE QN: SIGNIFICANT CHANGE UP

## 2024-06-28 ENCOUNTER — APPOINTMENT (OUTPATIENT)
Dept: UROLOGY | Facility: CLINIC | Age: 69
End: 2024-06-28
Payer: MEDICARE

## 2024-06-28 VITALS
RESPIRATION RATE: 14 BRPM | HEART RATE: 94 BPM | WEIGHT: 135 LBS | OXYGEN SATURATION: 97 % | SYSTOLIC BLOOD PRESSURE: 121 MMHG | HEIGHT: 60 IN | DIASTOLIC BLOOD PRESSURE: 71 MMHG | BODY MASS INDEX: 26.5 KG/M2

## 2024-06-28 DIAGNOSIS — N20.0 CALCULUS OF KIDNEY: ICD-10-CM

## 2024-06-28 DIAGNOSIS — N20.1 CALCULUS OF URETER: ICD-10-CM

## 2024-06-28 PROCEDURE — 99024 POSTOP FOLLOW-UP VISIT: CPT

## 2024-06-29 ENCOUNTER — TRANSCRIPTION ENCOUNTER (OUTPATIENT)
Age: 69
End: 2024-06-29

## 2024-07-01 RX ORDER — LEVOFLOXACIN 500 MG/1
500 TABLET, FILM COATED ORAL
Qty: 3 | Refills: 0 | Status: ACTIVE | COMMUNITY
Start: 2024-07-01 | End: 1900-01-01

## 2024-09-20 ENCOUNTER — APPOINTMENT (OUTPATIENT)
Dept: ULTRASOUND IMAGING | Facility: IMAGING CENTER | Age: 69
End: 2024-09-20
Payer: MEDICARE

## 2024-09-20 ENCOUNTER — APPOINTMENT (OUTPATIENT)
Dept: MRI IMAGING | Facility: IMAGING CENTER | Age: 69
End: 2024-09-20
Payer: MEDICARE

## 2024-09-20 ENCOUNTER — OUTPATIENT (OUTPATIENT)
Dept: OUTPATIENT SERVICES | Facility: HOSPITAL | Age: 69
LOS: 1 days | End: 2024-09-20
Payer: MEDICARE

## 2024-09-20 DIAGNOSIS — R25.2 CRAMP AND SPASM: ICD-10-CM

## 2024-09-20 DIAGNOSIS — Z98.890 OTHER SPECIFIED POSTPROCEDURAL STATES: Chronic | ICD-10-CM

## 2024-09-20 PROCEDURE — 76770 US EXAM ABDO BACK WALL COMP: CPT | Mod: 26

## 2024-09-20 PROCEDURE — 72141 MRI NECK SPINE W/O DYE: CPT | Mod: MH

## 2024-09-20 PROCEDURE — 70551 MRI BRAIN STEM W/O DYE: CPT | Mod: 26,MH

## 2024-09-20 PROCEDURE — 72141 MRI NECK SPINE W/O DYE: CPT | Mod: 26,MH

## 2024-09-20 PROCEDURE — 76770 US EXAM ABDO BACK WALL COMP: CPT

## 2024-09-20 PROCEDURE — 70551 MRI BRAIN STEM W/O DYE: CPT | Mod: MH

## 2024-10-11 ENCOUNTER — APPOINTMENT (OUTPATIENT)
Dept: UROLOGY | Facility: CLINIC | Age: 69
End: 2024-10-11
Payer: MEDICARE

## 2024-10-11 VITALS
WEIGHT: 135 LBS | OXYGEN SATURATION: 100 % | HEIGHT: 60 IN | RESPIRATION RATE: 14 BRPM | HEART RATE: 83 BPM | DIASTOLIC BLOOD PRESSURE: 82 MMHG | BODY MASS INDEX: 26.5 KG/M2 | SYSTOLIC BLOOD PRESSURE: 160 MMHG

## 2024-10-11 DIAGNOSIS — N20.0 CALCULUS OF KIDNEY: ICD-10-CM

## 2024-10-11 PROCEDURE — 99214 OFFICE O/P EST MOD 30 MIN: CPT

## 2024-12-27 ENCOUNTER — APPOINTMENT (OUTPATIENT)
Dept: CT IMAGING | Facility: IMAGING CENTER | Age: 69
End: 2024-12-27
Payer: MEDICARE

## 2024-12-27 ENCOUNTER — OUTPATIENT (OUTPATIENT)
Dept: OUTPATIENT SERVICES | Facility: HOSPITAL | Age: 69
LOS: 1 days | End: 2024-12-27
Payer: MEDICARE

## 2024-12-27 DIAGNOSIS — N20.0 CALCULUS OF KIDNEY: ICD-10-CM

## 2024-12-27 DIAGNOSIS — Z98.890 OTHER SPECIFIED POSTPROCEDURAL STATES: Chronic | ICD-10-CM

## 2024-12-27 PROCEDURE — 74176 CT ABD & PELVIS W/O CONTRAST: CPT | Mod: 26,MH

## 2024-12-27 PROCEDURE — 74176 CT ABD & PELVIS W/O CONTRAST: CPT

## 2025-01-08 ENCOUNTER — APPOINTMENT (OUTPATIENT)
Dept: UROLOGY | Facility: CLINIC | Age: 70
End: 2025-01-08
Payer: MEDICARE

## 2025-01-08 DIAGNOSIS — R82.991 HYPOCITRATURIA: ICD-10-CM

## 2025-01-08 DIAGNOSIS — N20.0 CALCULUS OF KIDNEY: ICD-10-CM

## 2025-01-08 DIAGNOSIS — R82.6 ABNORMAL URINE LEVELS OF SUBSTANCES CHIEFLY NONMEDICINAL AS TO SOURCE: ICD-10-CM

## 2025-01-08 PROCEDURE — 99214 OFFICE O/P EST MOD 30 MIN: CPT

## 2025-01-08 PROCEDURE — G2211 COMPLEX E/M VISIT ADD ON: CPT

## 2025-01-08 RX ORDER — POTASSIUM CITRATE 15 MEQ/1
15 MEQ TABLET, EXTENDED RELEASE ORAL
Qty: 180 | Refills: 3 | Status: ACTIVE | COMMUNITY
Start: 2025-01-08 | End: 1900-01-01

## 2025-03-26 ENCOUNTER — APPOINTMENT (OUTPATIENT)
Dept: ULTRASOUND IMAGING | Facility: IMAGING CENTER | Age: 70
End: 2025-03-26
Payer: MEDICARE

## 2025-03-26 ENCOUNTER — OUTPATIENT (OUTPATIENT)
Dept: OUTPATIENT SERVICES | Facility: HOSPITAL | Age: 70
LOS: 1 days | End: 2025-03-26
Payer: MEDICARE

## 2025-03-26 ENCOUNTER — APPOINTMENT (OUTPATIENT)
Dept: MRI IMAGING | Facility: IMAGING CENTER | Age: 70
End: 2025-03-26
Payer: MEDICARE

## 2025-03-26 DIAGNOSIS — G35 MULTIPLE SCLEROSIS: ICD-10-CM

## 2025-03-26 DIAGNOSIS — Z98.890 OTHER SPECIFIED POSTPROCEDURAL STATES: Chronic | ICD-10-CM

## 2025-03-26 DIAGNOSIS — N20.0 CALCULUS OF KIDNEY: ICD-10-CM

## 2025-03-26 PROCEDURE — 70551 MRI BRAIN STEM W/O DYE: CPT | Mod: MH

## 2025-03-26 PROCEDURE — 76770 US EXAM ABDO BACK WALL COMP: CPT | Mod: 26

## 2025-03-26 PROCEDURE — 76770 US EXAM ABDO BACK WALL COMP: CPT

## 2025-03-26 PROCEDURE — 70551 MRI BRAIN STEM W/O DYE: CPT | Mod: 26

## 2025-04-11 ENCOUNTER — NON-APPOINTMENT (OUTPATIENT)
Age: 70
End: 2025-04-11

## 2025-04-11 ENCOUNTER — APPOINTMENT (OUTPATIENT)
Dept: UROLOGY | Facility: CLINIC | Age: 70
End: 2025-04-11
Payer: MEDICARE

## 2025-04-11 DIAGNOSIS — N20.0 CALCULUS OF KIDNEY: ICD-10-CM

## 2025-04-11 DIAGNOSIS — N20.1 CALCULUS OF URETER: ICD-10-CM

## 2025-04-11 DIAGNOSIS — R82.6 ABNORMAL URINE LEVELS OF SUBSTANCES CHIEFLY NONMEDICINAL AS TO SOURCE: ICD-10-CM

## 2025-04-11 DIAGNOSIS — R82.991 HYPOCITRATURIA: ICD-10-CM

## 2025-04-11 PROCEDURE — G2211 COMPLEX E/M VISIT ADD ON: CPT

## 2025-04-11 PROCEDURE — 99214 OFFICE O/P EST MOD 30 MIN: CPT

## 2025-06-26 ENCOUNTER — TRANSCRIPTION ENCOUNTER (OUTPATIENT)
Age: 70
End: 2025-06-26

## 2025-07-28 NOTE — H&P PST ADULT - NSANTHSNORERD_ENT_A_CORE
Magan A Hamilton called requesting a refill on the following medications:  Requested Prescriptions     Pending Prescriptions Disp Refills    trospium (SANCTURA) 20 MG tablet [Pharmacy Med Name: Trospium Chloride 20 MG Oral Tablet] 90 tablet 0     Sig: TAKE 1 TABLET BY MOUTH AT BEDTIME     Pharmacy verified:  [email or web address]      Date of last visit:   Date of next visit (if applicable): Visit date not found                            No

## 2025-08-21 ENCOUNTER — OUTPATIENT (OUTPATIENT)
Dept: OUTPATIENT SERVICES | Facility: HOSPITAL | Age: 70
LOS: 1 days | End: 2025-08-21
Payer: MEDICARE

## 2025-08-21 ENCOUNTER — APPOINTMENT (OUTPATIENT)
Dept: ULTRASOUND IMAGING | Facility: CLINIC | Age: 70
End: 2025-08-21
Payer: MEDICARE

## 2025-08-21 DIAGNOSIS — N20.0 CALCULUS OF KIDNEY: ICD-10-CM

## 2025-08-21 DIAGNOSIS — Z98.890 OTHER SPECIFIED POSTPROCEDURAL STATES: Chronic | ICD-10-CM

## 2025-08-21 PROCEDURE — 76770 US EXAM ABDO BACK WALL COMP: CPT

## 2025-08-21 PROCEDURE — 76770 US EXAM ABDO BACK WALL COMP: CPT | Mod: 26

## 2025-08-29 ENCOUNTER — NON-APPOINTMENT (OUTPATIENT)
Age: 70
End: 2025-08-29

## 2025-08-29 ENCOUNTER — APPOINTMENT (OUTPATIENT)
Dept: UROLOGY | Facility: CLINIC | Age: 70
End: 2025-08-29
Payer: MEDICARE

## 2025-08-29 VITALS
SYSTOLIC BLOOD PRESSURE: 134 MMHG | HEART RATE: 85 BPM | RESPIRATION RATE: 16 BRPM | DIASTOLIC BLOOD PRESSURE: 73 MMHG | OXYGEN SATURATION: 99 %

## 2025-08-29 DIAGNOSIS — R82.991 HYPOCITRATURIA: ICD-10-CM

## 2025-08-29 DIAGNOSIS — N20.0 CALCULUS OF KIDNEY: ICD-10-CM

## 2025-08-29 DIAGNOSIS — R82.6 ABNORMAL URINE LEVELS OF SUBSTANCES CHIEFLY NONMEDICINAL AS TO SOURCE: ICD-10-CM

## 2025-08-29 PROCEDURE — 99214 OFFICE O/P EST MOD 30 MIN: CPT

## (undated) DEVICE — SOL IRR POUR H2O 1500ML

## (undated) DEVICE — TUBING CONNECTING FOR DRAINAGE BAG MALE / FEMALE 14FR 30CM

## (undated) DEVICE — PACK CYSTO

## (undated) DEVICE — DRAPE COVER DOME COVEX 27"

## (undated) DEVICE — BOSTON SCIENTIFC ENCORE 26 INFLATOR

## (undated) DEVICE — ACMI SELF-SEALING SEAL UP TO 7FR

## (undated) DEVICE — DRAPE EQUIPMENT BANDED BAG 30 X 30" (SHOWER CAP)

## (undated) DEVICE — BAG URINE W METER 2L

## (undated) DEVICE — SPECIMEN CONTAINER 100ML

## (undated) DEVICE — TUBING RANGER FLUID IRRIGATION SET DISP

## (undated) DEVICE — GLV 8 PROTEXIS (WHITE)

## (undated) DEVICE — SYR ASEPTO

## (undated) DEVICE — DRAPE U 47X51" NON STERILE

## (undated) DEVICE — DRAPE LINGEMAN TUR

## (undated) DEVICE — ADAPTER CHECK FLO 9FR STERILE

## (undated) DEVICE — WARMING BLANKET UPPER ADULT

## (undated) DEVICE — DRAPE DRAINAGE BAG RELAX & GEMINI

## (undated) DEVICE — TUBING THERMADX UROLOGY

## (undated) DEVICE — SUT POLYSORB 2-0 18" V-20

## (undated) DEVICE — SOL IRR POUR NS 0.9% 500ML

## (undated) DEVICE — POSITIONER CUSHION INSERT PRONE VIEW LG

## (undated) DEVICE — NDL 20CM TROCAR

## (undated) DEVICE — TUBING SUCTION 20FT

## (undated) DEVICE — POSITIONER FOAM EGG CRATE ULNAR 2PCS (PINK)

## (undated) DEVICE — SOL IRR BAG H2O 3000ML

## (undated) DEVICE — AMPLATZ RENAL DILATOR 6FR-30FR X 20CM

## (undated) DEVICE — IRR BULB PATHFINDER + 10"

## (undated) DEVICE — SYR LUER LOK 10CC

## (undated) DEVICE — VENODYNE/SCD SLEEVE CALF MEDIUM

## (undated) DEVICE — FOLEY HOLDER STATLOCK 2 WAY ADULT

## (undated) DEVICE — SOL IRR BAG NS 0.9% 3000ML

## (undated) DEVICE — DRAPE NEPHROSCOPY 72X118"

## (undated) DEVICE — DRSG TEGADERM 6"X8"

## (undated) DEVICE — GOWN TRIMAX LG

## (undated) DEVICE — PRESSURE INFUSOR BAG 3000ML

## (undated) DEVICE — NDL BIOPSY TROCAR TWO-PART 18G X 20CM

## (undated) DEVICE — PREP BETADINE KIT

## (undated) DEVICE — Device

## (undated) DEVICE — POSITIONER FOAM HEADREST (PINK)

## (undated) DEVICE — GLV 8 PROTEXIS (CREAM) NEU-THERA

## (undated) DEVICE — DRAPE TOWEL BLUE 17" X 24"